# Patient Record
Sex: FEMALE | Race: WHITE | Employment: FULL TIME | URBAN - METROPOLITAN AREA
[De-identification: names, ages, dates, MRNs, and addresses within clinical notes are randomized per-mention and may not be internally consistent; named-entity substitution may affect disease eponyms.]

---

## 2017-07-18 ENCOUNTER — HOSPITAL ENCOUNTER (OUTPATIENT)
Dept: MAMMOGRAPHY | Age: 47
Discharge: HOME | End: 2017-07-18
Attending: SPECIALIST
Payer: COMMERCIAL

## 2017-07-18 DIAGNOSIS — Z12.31 VISIT FOR SCREENING MAMMOGRAM: ICD-10-CM

## 2017-07-18 PROCEDURE — 77067 SCR MAMMO BI INCL CAD: CPT

## 2018-01-18 ENCOUNTER — HOSPITAL ENCOUNTER (OUTPATIENT)
Dept: MRI IMAGING | Age: 48
Discharge: HOME | End: 2018-01-18
Attending: FAMILY MEDICINE
Payer: COMMERCIAL

## 2018-01-18 DIAGNOSIS — M54.2 CERVICALGIA: ICD-10-CM

## 2018-01-18 PROCEDURE — 72141 MRI NECK SPINE W/O DYE: CPT

## 2018-01-25 ENCOUNTER — HOSPITAL ENCOUNTER (EMERGENCY)
Age: 48
Discharge: HOME | End: 2018-01-25
Attending: EMERGENCY MEDICINE
Payer: COMMERCIAL

## 2018-01-25 VITALS
RESPIRATION RATE: 16 BRPM | TEMPERATURE: 98.4 F | HEIGHT: 65 IN | BODY MASS INDEX: 38.16 KG/M2 | WEIGHT: 229.06 LBS | OXYGEN SATURATION: 98 % | SYSTOLIC BLOOD PRESSURE: 170 MMHG | DIASTOLIC BLOOD PRESSURE: 86 MMHG | HEART RATE: 98 BPM

## 2018-01-25 DIAGNOSIS — M54.12 CERVICAL RADICULOPATHY: Primary | ICD-10-CM

## 2018-01-25 PROCEDURE — 99282 EMERGENCY DEPT VISIT SF MDM: CPT

## 2018-01-25 RX ORDER — BACLOFEN 10 MG/1
10 TABLET ORAL 3 TIMES DAILY
Qty: 20 TAB | Refills: 0 | Status: SHIPPED | OUTPATIENT
Start: 2018-01-25 | End: 2021-12-10

## 2018-01-25 RX ORDER — IBUPROFEN 800 MG/1
800 TABLET ORAL
Qty: 20 TAB | Refills: 0 | Status: SHIPPED | OUTPATIENT
Start: 2018-01-25 | End: 2018-02-01

## 2018-01-25 RX ORDER — OXYCODONE HYDROCHLORIDE 5 MG/1
5 TABLET ORAL
Qty: 20 TAB | Refills: 0 | Status: ON HOLD | OUTPATIENT
Start: 2018-01-25 | End: 2021-09-17

## 2018-01-25 NOTE — ED NOTES
PA has reviewed discharge instructions with the patient. The patient verbalized understanding. Patient discharged home ambulatory with mother.

## 2018-01-25 NOTE — LETTER
[address] 
[facility] EMERGENCY DEPT 
[address] [facility] [address] [id number] [phone] Work/School Note Date: 1/25/2018 To Whom It May concern: 
 
Loreta Sim was seen and treated today in the emergency room by the following provider(s): 
Attending Provider: Concur [place], DO Physician Assistant: SUBHA Reeder. Loreta Sim may return to work on [id number]. Sincerely, SUBHA Reeder

## 2018-01-25 NOTE — ED PROVIDER NOTES
EMERGENCY DEPARTMENT HISTORY AND PHYSICAL EXAM      Date: 1/25/2018  Patient Name: Aakash Duque    History of Presenting Illness     Chief Complaint   Patient presents with    Arm Pain     right arm pain from nerve problem in neck, has appointment for neurosurgeon        History Provided By: Patient    HPI: Aakash Duque, 50 y.o. female with PMHx significant for HTN, presents ambulatory to the ED with cc of constant, moderate right arm/neck pain which radiates down from the neck with assocaited weakness x 1 month. Pt reports that she had an MRI on 1/18/18. Review of the MRI shows narrowing of the spinal canal, DDD, and mild to moderate spinal canal stenosis as well as neuroforaminal narrowing worst at the C3-C4 and C5-C6; spinal canal stenosis is worst at C4-C5. She notes that she has a follow up with Dr. Yajaira Brunson (neurosurgery) in early February 2018, however, she states she has received no relief of her pain. She notes her PCP has treated her symptoms with Valium, Toradol, Decadron, and Oxycodone without relief. She denies any further symptoms and complaints at time of exam.     PCP: Shanon Closs, MD   Neurosurgery: Carlene Law MD      There are no other complaints, changes, or physical findings at this time. Current Outpatient Prescriptions   Medication Sig Dispense Refill    ibuprofen (MOTRIN) 800 mg tablet Take 1 Tab by mouth every eight (8) hours as needed for Pain for up to 7 days. 20 Tab 0    oxyCODONE IR (ROXICODONE) 5 mg immediate release tablet Take 1 Tab by mouth every four (4) hours as needed for Pain. Max Daily Amount: 30 mg. 20 Tab 0    baclofen (LIORESAL) 10 mg tablet Take 1 Tab by mouth three (3) times daily. 20 Tab 0    oxyCODONE IR (ROXICODONE) 5 mg immediate release tablet Take 1-2 Tabs by mouth every three (3) hours as needed. Max Daily Amount: 80 mg. 70 Tab 0    diazepam (VALIUM) 5 mg tablet Take 1 Tab by mouth every six (6) hours as needed (Muscle Spasms).  Max Daily Amount: 20 mg. 40 Tab 0    montelukast (SINGULAIR) 10 mg tablet Take 10 mg by mouth daily.  valsartan-hydrochlorothiazide (DIOVAN-HCT) 320-25 mg per tablet Take 1 Tab by mouth daily.  levocetirizine (XYZAL) 5 mg tablet Take 5 mg by mouth daily.  DULoxetine (CYMBALTA) 30 mg capsule Take 90 mg by mouth daily. Past History     Past Medical History:  Past Medical History:   Diagnosis Date    Chronic pain     back-low down right leg to foot    GERD (gastroesophageal reflux disease)     Hypertension     Psychiatric disorder     DEPRESSION       Past Surgical History:  Past Surgical History:   Procedure Laterality Date    ABDOMEN SURGERY PROC UNLISTED      [facility]    HX BREAST BIOPSY Right     benign core bx    HX CHOLECYSTECTOMY      HX GYN       x 2    HX GYN      hysterectomy    HX HERNIA REPAIR  1077    umbilical       Family History:  Family History   Problem Relation Age of Onset    Hypertension Mother     Arthritis-osteo Mother     Hypertension Father     Arthritis-osteo Father     Breast Cancer Sister 46       Social History:  Social History   Substance Use Topics    Smoking status: Current Every Day Smoker     Packs/day: 1.00     Years: 20.00    Smokeless tobacco: Never Used    Alcohol use Yes      Comment: social       Allergies: Allergies   Allergen Reactions    Augmentin [Amoxicillin-Pot Clavulanate] Nausea and Vomiting         Review of Systems   Review of Systems   Constitutional: Negative for fatigue and fever. HENT: Negative for ear pain and sore throat. Eyes: Negative for pain, redness and visual disturbance. Respiratory: Negative for cough and shortness of breath. Cardiovascular: Negative for chest pain and palpitations. Gastrointestinal: Negative for abdominal pain, nausea and vomiting. Genitourinary: Negative for dysuria, frequency and urgency. Musculoskeletal: Negative for back pain, gait problem, neck pain and neck stiffness.         Positive for right upper extremity/neck pain. Skin: Negative for rash and wound. Neurological: Negative for dizziness, weakness, light-headedness, numbness and headaches. Physical Exam   Physical Exam   Constitutional: She is oriented to person, place, and time. She appears well-developed and well-nourished. Non-toxic appearance. No distress. HENT:   Head: Normocephalic and atraumatic. Right Ear: External ear normal.   Left Ear: External ear normal.   Nose: Nose normal.   Mouth/Throat: Uvula is midline. No trismus in the jaw. Eyes: Conjunctivae and EOM are normal. Pupils are equal, round, and reactive to light. No scleral icterus. Neck: Normal range of motion and full passive range of motion without pain. C-Spine: No bruising, redness, or swelling. There is right sided paracervical muscle soreness throughout the trapezius to the posterior aspect of the right upper arm just proximal to the elbow with no atrophy. Cardiovascular: Normal rate and regular rhythm. Pulmonary/Chest: Effort normal. No accessory muscle usage. No tachypnea. No respiratory distress. She has no decreased breath sounds. She has no wheezes. Abdominal: Soft. There is no tenderness. Musculoskeletal: Normal range of motion. Full active ROM of right arm. Neurological: She is alert and oriented to person, place, and time. She is not disoriented. No cranial nerve deficit. GCS eye subscore is 4. GCS verbal subscore is 5. GCS motor subscore is 6. Skin: Skin is intact. No rash noted. Psychiatric: She has a normal mood and affect. Her speech is normal.   Nursing note and vitals reviewed. Medical Decision Making   I am the first provider for this patient. I reviewed the vital signs, available nursing notes, past medical history, past surgical history, family history and social history. Vital Signs-Reviewed the patient's vital signs.   Patient Vitals for the past 12 hrs:   Temp Pulse Resp BP SpO2   01/25/18 1235 98.4 °F (36.9 °C) 98 16 170/86 98 %       Records Reviewed: Nursing Notes, Old Medical Records and Previous Radiology Studies    Provider Notes (Medical Decision Making):   Recent MRI reviewed. Presentation consistent with cervical radiculopathy. Neurosurgery appointment pending.  reviewed. ED Course:   Initial assessment performed. The patients presenting problems have been discussed, and they are in agreement with the care plan formulated and outlined with them. I have encouraged them to ask questions as they arise throughout their visit. 1:07 PM  I have reviewed discharge instructions with the patient and explained medications that she is being discharged with. The patient verbalized understanding and agrees with plan. Disposition:  Discharge Note:  1:07 PM  The patient has been re-evaluated and is ready for discharge. Reviewed available results with patient. Counseled patient on diagnosis and care plan. Patient has expressed understanding, and all questions have been answered. Patient agrees with plan and agrees to follow up as recommended, or return to the ED if their symptoms worsen. Discharge instructions have been provided and explained to the patient, along with reasons to return to the ED. PLAN:  1. Current Discharge Medication List      START taking these medications    Details   ibuprofen (MOTRIN) 800 mg tablet Take 1 Tab by mouth every eight (8) hours as needed for Pain for up to 7 days. Qty: 20 Tab, Refills: 0      !! oxyCODONE IR (ROXICODONE) 5 mg immediate release tablet Take 1 Tab by mouth every four (4) hours as needed for Pain. Max Daily Amount: 30 mg.  Qty: 20 Tab, Refills: 0    Associated Diagnoses: Cervical radiculopathy      baclofen (LIORESAL) 10 mg tablet Take 1 Tab by mouth three (3) times daily. Qty: 20 Tab, Refills: 0       !! - Potential duplicate medications found. Please discuss with provider.       CONTINUE these medications which have NOT CHANGED    Details   !! oxyCODONE IR (ROXICODONE) 5 mg immediate release tablet Take 1-2 Tabs by mouth every three (3) hours as needed. Max Daily Amount: 80 mg.  Qty: 70 Tab, Refills: 0       !! - Potential duplicate medications found. Please discuss with provider. 2.   Follow-up Information     Follow up With Details Comments Contact Info    Megha Evans MD Schedule an appointment as soon as possible for a visit NEUROSURGERY: call to schedule follow up [address] [facility]  [phone]          Return to ED if worse     Diagnosis     Clinical Impression:   1. Cervical radiculopathy        Attestations: This note is prepared by Nydia Francisco, acting as Scribe for Clyde Saunders. MUNA Harrington: The scribe's documentation has been prepared under my direction and personally reviewed by me in its entirety. I confirm that the note above accurately reflects all work, treatment, procedures, and medical decision making performed by me.

## 2018-01-25 NOTE — DISCHARGE INSTRUCTIONS
Thank you! Thank you for allowing us to provide you with excellent care today. We hope we addressed all of your concerns and needs. We strive to provide excellent quality care in the Emergency Department. You will receive a survey after your visit to evaluate the care you were provided. Please rate us a level 5 (excellent), as anything less than excellent does not meet our goals. If you feel that you have not received excellent quality care or timely care, please ask to speak to the nurse manager. Please choose us in the future for your continued health care needs. ------------------------------------------------------------------------------------------------------------  The exam and treatment you received in the Emergency Department were for an urgent problem and are not intended as complete care. It is important that you follow up with a doctor, nurse practitioner, or physician assistant for ongoing care. If your symptoms become worse or you do not improve as expected and you are unable to reach your usual health care provider, you should return to the Emergency Department. We are available 24 hours a day. Please take your discharge instructions with you when you go to your follow-up appointment. If you have any problem arranging a follow-up appointment, contact the Emergency Department immediately. If a prescription has been provided, please have it filled as soon as possible to prevent a delay in treatment. Read the entire medication instruction sheet provided to you by the pharmacy. If you have any questions or reservations about taking the medication due to side effects or interactions with other medications, please call your primary care physician or contact the ER to speak with the charge nurse. Make an appointment with your family doctor or the physician you were referred to for follow-up of this visit as instructed on your discharge paperwork, as this is mandatory follow-up. Return to the ER if you are unable to be seen or if you are unable to be seen in a timely manner. If you have any problem arranging the follow-up visit, contact the Emergency Department immediately.

## 2018-07-25 ENCOUNTER — HOSPITAL ENCOUNTER (OUTPATIENT)
Dept: MAMMOGRAPHY | Age: 48
Discharge: HOME | End: 2018-07-25
Attending: SPECIALIST
Payer: COMMERCIAL

## 2018-07-25 DIAGNOSIS — Z12.39 SCREENING BREAST EXAMINATION: ICD-10-CM

## 2018-07-25 PROCEDURE — 77067 SCR MAMMO BI INCL CAD: CPT

## 2019-09-25 ENCOUNTER — HOSPITAL ENCOUNTER (OUTPATIENT)
Dept: MAMMOGRAPHY | Age: 49
Discharge: HOME | End: 2019-09-25
Attending: SPECIALIST
Payer: COMMERCIAL

## 2019-09-25 DIAGNOSIS — Z12.39 BREAST SCREENING: ICD-10-CM

## 2019-09-25 PROCEDURE — 77067 SCR MAMMO BI INCL CAD: CPT

## 2020-09-14 NOTE — PROGRESS NOTES
Have you been tested for COVID-19 in the past 7 days? Do you have a personal history of COVID-19 within the past 28 days? If Yes, What was the method of testing: clinical assumption or test result? Have you had close contact with a known to be positive COVID-19 patient within the past 14 days? 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 NO    Do you live in a SNF, adult home or other institutional setting? NO  If Yes, have they experienced a flood of COVID-19 positive patients? NO    In the past 2-14 days have you had any of the following symptoms    Cough   New onset Shortness of breath or difficulty breathing NO    Or at least two of these symptoms:   Fever greater than 100 F   Chills   Repeated shaking with chills   Muscle pain   Headache   Sore throat   New loss of taste or smell   New onset diarrhea  NO

## 2020-09-15 ENCOUNTER — HOSPITAL ENCOUNTER (OUTPATIENT)
Dept: INTERVENTIONAL RADIOLOGY/VASCULAR | Age: 50
Discharge: HOME | End: 2020-09-15
Attending: PHYSICIAN ASSISTANT
Payer: COMMERCIAL

## 2020-09-15 VITALS — HEIGHT: 65 IN | BODY MASS INDEX: 41.65 KG/M2 | WEIGHT: 250 LBS

## 2020-09-15 DIAGNOSIS — M54.50 LOW BACK PAIN WITH RADIATION: ICD-10-CM

## 2020-09-15 DIAGNOSIS — M54.2 NECK PAIN, BILATERAL: ICD-10-CM

## 2020-09-15 DIAGNOSIS — M43.06 SPONDYLOLYSIS, LUMBAR REGION: ICD-10-CM

## 2020-09-15 DIAGNOSIS — M48.062 LUMBAR STENOSIS WITH NEUROGENIC CLAUDICATION: ICD-10-CM

## 2020-09-15 DIAGNOSIS — M50.30 DDD (DEGENERATIVE DISC DISEASE), CERVICAL: ICD-10-CM

## 2020-09-15 DIAGNOSIS — M48.061 SPINAL STENOSIS OF LUMBAR REGION: ICD-10-CM

## 2020-09-15 DIAGNOSIS — M43.16 SPONDYLOLISTHESIS OF LUMBAR REGION: ICD-10-CM

## 2020-09-15 DIAGNOSIS — M48.061 LUMBAR FORAMINAL STENOSIS: ICD-10-CM

## 2020-09-15 PROCEDURE — 74011000636 HC RX REV CODE- 636: Performed by: STUDENT IN AN ORGANIZED HEALTH CARE EDUCATION/TRAINING PROGRAM

## 2020-09-15 PROCEDURE — 64483 NJX AA&/STRD TFRM EPI L/S 1: CPT

## 2020-09-15 PROCEDURE — 74011000250 HC RX REV CODE- 250: Performed by: STUDENT IN AN ORGANIZED HEALTH CARE EDUCATION/TRAINING PROGRAM

## 2020-09-15 PROCEDURE — 74011250636 HC RX REV CODE- 250/636: Performed by: STUDENT IN AN ORGANIZED HEALTH CARE EDUCATION/TRAINING PROGRAM

## 2020-09-15 PROCEDURE — 77030003666 HC NDL SPINAL BD -A

## 2020-09-15 PROCEDURE — 2709999900 HC NON-CHARGEABLE SUPPLY

## 2020-09-15 RX ORDER — LIDOCAINE HYDROCHLORIDE 10 MG/ML
4 INJECTION, SOLUTION EPIDURAL; INFILTRATION; INTRACAUDAL; PERINEURAL ONCE
Status: COMPLETED | OUTPATIENT
Start: 2020-09-15 | End: 2020-09-15

## 2020-09-15 RX ORDER — LIDOCAINE HYDROCHLORIDE 20 MG/ML
20 INJECTION, SOLUTION INFILTRATION; PERINEURAL ONCE
Status: DISCONTINUED | OUTPATIENT
Start: 2020-09-15 | End: 2020-09-15 | Stop reason: SDUPTHER

## 2020-09-15 RX ORDER — DEXAMETHASONE SODIUM PHOSPHATE 10 MG/ML
20 INJECTION INTRAMUSCULAR; INTRAVENOUS ONCE
Status: DISCONTINUED | OUTPATIENT
Start: 2020-09-15 | End: 2020-09-15 | Stop reason: SDUPTHER

## 2020-09-15 RX ORDER — SODIUM CHLORIDE 9 MG/ML
3 INJECTION INTRAMUSCULAR; INTRAVENOUS; SUBCUTANEOUS ONCE
Status: DISCONTINUED | OUTPATIENT
Start: 2020-09-15 | End: 2020-09-15 | Stop reason: SDUPTHER

## 2020-09-15 RX ORDER — LIDOCAINE HYDROCHLORIDE 20 MG/ML
20 INJECTION, SOLUTION INFILTRATION; PERINEURAL ONCE
Status: COMPLETED | OUTPATIENT
Start: 2020-09-15 | End: 2020-09-15

## 2020-09-15 RX ORDER — DEXAMETHASONE SODIUM PHOSPHATE 10 MG/ML
10 INJECTION INTRAMUSCULAR; INTRAVENOUS ONCE
Status: COMPLETED | OUTPATIENT
Start: 2020-09-15 | End: 2020-09-15

## 2020-09-15 RX ORDER — LIDOCAINE HYDROCHLORIDE 10 MG/ML
5 INJECTION, SOLUTION EPIDURAL; INFILTRATION; INTRACAUDAL; PERINEURAL ONCE
Status: DISCONTINUED | OUTPATIENT
Start: 2020-09-15 | End: 2020-09-15 | Stop reason: SDUPTHER

## 2020-09-15 RX ADMIN — LIDOCAINE HYDROCHLORIDE 40 MG: 20 INJECTION, SOLUTION INFILTRATION; PERINEURAL at 14:27

## 2020-09-15 RX ADMIN — IOPAMIDOL 3 ML: 408 INJECTION, SOLUTION INTRATHECAL at 14:27

## 2020-09-15 RX ADMIN — DEXAMETHASONE SODIUM PHOSPHATE 14 MG: 10 INJECTION, SOLUTION INTRAMUSCULAR; INTRAVENOUS at 14:28

## 2020-09-15 RX ADMIN — LIDOCAINE HYDROCHLORIDE 5 ML: 10 INJECTION, SOLUTION EPIDURAL; INFILTRATION; INTRACAUDAL; PERINEURAL at 14:28

## 2020-09-15 NOTE — PROCEDURES
Interventional Radiology  Procedure Note        9/15/2020 2:31 PM    Patient: Geovanna Valencia     Informed consent obtained    Diagnosis: back pain with bilateral radiculopathy    Procedure(s): Bilateral L3-4 transforaminal KELVIN    Specimens removed:  none    Complications: None    Primary Physician: Duane Peek, MD    Recomendations: N/A    Discharge Disposition: stable; discharge to home    Full dictated report to follow    Duane Peek, MD  Interventional Radiology  [facility]  2:31 PM, 9/15/2020

## 2020-09-15 NOTE — DISCHARGE INSTRUCTIONS
[facility]  Special Procedures/Radiology Department      Steroidal Injection      Go home and rest.     No vigorous physical activity today. Be aware that numbness and/or tingling can occur up to 24 hours after the injection. No driving today. Resume your previous diet. Resume your previous medications. Depending on your job, you may return to work in 25 to 48 hours. It may take up to one week after the injection to see a change or an improvement in your symptoms. Be sure to follow up with your physician. Tell your physician if the injection helped with your symptoms or if the injection did nothing for your symptoms. For minor discomfort, you can take Tylenol, as directed on the label.       If you have any questions or concerns, please call [phone] and ask for the nurse on-call    Other:

## 2020-09-15 NOTE — H&P
Radiology History and Physical    Patient: Donovan Cramer 44 y.o. female       Chief Complaint: Back pain with bilateral radiculopathy    History of Present Illness: 44year old woman with chronic back pain and bilateral radiculopathy, pain and paresthesia radiating down anterior parts of both legs, right worse than left.     History:    Past Medical History:   Diagnosis Date    Chronic pain     back-low down right leg to foot    GERD (gastroesophageal reflux disease)     Hypertension     Psychiatric disorder     DEPRESSION     Family History   Problem Relation Age of Onset    Hypertension Mother    [facility] Arthritis-osteo Mother     Hypertension Father     Arthritis-osteo Father     Breast Cancer Sister 46     Social History     Socioeconomic History    Marital status: SINGLE     Spouse name: Not on file    Number of children: Not on file    Years of education: Not on file    Highest education level: Not on file   Occupational History    Not on file   Social Needs    Financial resource strain: Not on file    Food insecurity     Worry: Not on file     Inability: Not on file    Transportation needs     Medical: Not on file     Non-medical: Not on file   Tobacco Use    Smoking status: Current Every Day Smoker     Packs/day: 1.00     Years: 20.00     Pack years: 20.00    Smokeless tobacco: Never Used   Substance and Sexual Activity    Alcohol use: Yes     Comment: social    Drug use: No    Sexual activity: Not on file   Lifestyle    Physical activity     Days per week: Not on file     Minutes per session: Not on file    Stress: Not on file   Relationships    Social connections     Talks on phone: Not on file     Gets together: Not on file     Attends Hinduism service: Not on file     Active member of club or organization: Not on file     Attends meetings of clubs or organizations: Not on file     Relationship status: Not on file    Intimate partner violence     Fear of current or ex partner: Not on file     Emotionally abused: Not on file     Physically abused: Not on file     Forced sexual activity: Not on file   Other Topics Concern    Not on file   Social History Narrative    Not on file       Allergies: Allergies   Allergen Reactions    Augmentin [Amoxicillin-Pot Clavulanate] Nausea and Vomiting       Current Medications:  Current Outpatient Medications   Medication Sig    oxyCODONE IR (ROXICODONE) 5 mg immediate release tablet Take 1 Tab by mouth every four (4) hours as needed for Pain. Max Daily Amount: 30 mg.    baclofen (LIORESAL) 10 mg tablet Take 1 Tab by mouth three (3) times daily.  oxyCODONE IR (ROXICODONE) 5 mg immediate release tablet Take 1-2 Tabs by mouth every three (3) hours as needed. Max Daily Amount: 80 mg.    diazepam (VALIUM) 5 mg tablet Take 1 Tab by mouth every six (6) hours as needed (Muscle Spasms). Max Daily Amount: 20 mg.    montelukast (SINGULAIR) 10 mg tablet Take 10 mg by mouth daily.  valsartan-hydrochlorothiazide (DIOVAN-HCT) 320-25 mg per tablet Take 1 Tab by mouth daily.  levocetirizine (XYZAL) 5 mg tablet Take 5 mg by mouth daily.  DULoxetine (CYMBALTA) 30 mg capsule Take 90 mg by mouth daily. Current Facility-Administered Medications   Medication Dose Route Frequency    iopamidoL (ISOVUE-M 200) 41 % contrast solution 3 mL  3 mL IntraSPINal ONCE    lidocaine (XYLOCAINE) 20 mg/mL (2 %) injection 400 mg  20 mL SubCUTAneous ONCE    lidocaine (PF) (XYLOCAINE) 10 mg/mL (1 %) injection 4 mL  4 mL SubCUTAneous ONCE    dexamethasone (PF) (DECADRON) 10 mg/mL injection 10 mg  10 mg IntraVENous ONCE        Physical Exam:  Height 5' 5\" (1.651 m), weight 113.4 kg (250 lb), not currently breastfeeding.   GENERAL: alert, cooperative, no distress, appears stated age,   LUNG: Nonlabored respiration on room air  HEART: regular rate and rhythm    Alerts:    Hospital Problems  Date Reviewed: 6/28/2016    None          Laboratory:    No results for input(s): HGB, HCT, WBC, PLT, INR, BUN, CREA, K, CRCLT, HGBEXT, HCTEXT, PLTEXT, INREXT in the last 72 hours. No lab exists for component: PTT, PT      Plan of Care/Planned Procedure:  Risks, benefits, and alternatives reviewed with patient and she agrees to proceed with the procedure.      Bilateral L3-4 transforaminal KELVIN    Jesenia Cruz MD  Interventional Radiology  [facility]  2:03 PM, 9/15/2020

## 2020-10-23 ENCOUNTER — TRANSCRIBE ORDER (OUTPATIENT)
Dept: SCHEDULING | Age: 50
End: 2020-10-23

## 2020-10-23 DIAGNOSIS — M50.30 DDD (DEGENERATIVE DISC DISEASE), CERVICAL: ICD-10-CM

## 2020-10-23 DIAGNOSIS — M54.50 LOW BACK PAIN WITH RADIATION: ICD-10-CM

## 2020-10-23 DIAGNOSIS — M48.062 LUMBAR STENOSIS WITH NEUROGENIC CLAUDICATION: ICD-10-CM

## 2020-10-23 DIAGNOSIS — M48.061 LUMBAR FORAMINAL STENOSIS: ICD-10-CM

## 2020-10-23 DIAGNOSIS — M54.2 NECK PAIN: Primary | ICD-10-CM

## 2020-11-11 ENCOUNTER — HOSPITAL ENCOUNTER (OUTPATIENT)
Dept: MRI IMAGING | Age: 50
Discharge: HOME | End: 2020-11-11
Attending: PHYSICIAN ASSISTANT
Payer: COMMERCIAL

## 2020-11-11 DIAGNOSIS — M50.30 DDD (DEGENERATIVE DISC DISEASE), CERVICAL: ICD-10-CM

## 2020-11-11 DIAGNOSIS — M48.061 LUMBAR FORAMINAL STENOSIS: ICD-10-CM

## 2020-11-11 DIAGNOSIS — M54.50 LOW BACK PAIN WITH RADIATION: ICD-10-CM

## 2020-11-11 DIAGNOSIS — M48.062 LUMBAR STENOSIS WITH NEUROGENIC CLAUDICATION: ICD-10-CM

## 2020-11-11 DIAGNOSIS — M54.2 NECK PAIN: ICD-10-CM

## 2020-11-11 PROCEDURE — 72141 MRI NECK SPINE W/O DYE: CPT

## 2021-02-17 ENCOUNTER — TRANSCRIBE ORDER (OUTPATIENT)
Dept: SCHEDULING | Age: 51
End: 2021-02-17

## 2021-02-17 DIAGNOSIS — Z12.31 SCREENING MAMMOGRAM FOR HIGH-RISK PATIENT: Primary | ICD-10-CM

## 2021-03-18 ENCOUNTER — HOSPITAL ENCOUNTER (OUTPATIENT)
Dept: MAMMOGRAPHY | Age: 51
Discharge: HOME | End: 2021-03-18
Attending: SPECIALIST
Payer: COMMERCIAL

## 2021-03-18 DIAGNOSIS — Z12.31 SCREENING MAMMOGRAM FOR HIGH-RISK PATIENT: ICD-10-CM

## 2021-03-18 PROCEDURE — 77067 SCR MAMMO BI INCL CAD: CPT

## 2021-09-17 ENCOUNTER — HOSPITAL ENCOUNTER (OUTPATIENT)
Age: 51
Setting detail: OUTPATIENT SURGERY
Discharge: HOME | End: 2021-09-17
Attending: INTERNAL MEDICINE | Admitting: INTERNAL MEDICINE
Payer: COMMERCIAL

## 2021-09-17 VITALS
SYSTOLIC BLOOD PRESSURE: 124 MMHG | OXYGEN SATURATION: 95 % | RESPIRATION RATE: 13 BRPM | HEIGHT: 65 IN | BODY MASS INDEX: 41.65 KG/M2 | HEART RATE: 64 BPM | TEMPERATURE: 98.1 F | DIASTOLIC BLOOD PRESSURE: 76 MMHG | WEIGHT: 250 LBS

## 2021-09-17 DIAGNOSIS — R94.39 ABNORMAL STRESS TEST: ICD-10-CM

## 2021-09-17 PROCEDURE — 93458 L HRT ARTERY/VENTRICLE ANGIO: CPT | Performed by: INTERNAL MEDICINE

## 2021-09-17 PROCEDURE — 77030004532 HC CATH ANGI DX IMP BSC -A: Performed by: INTERNAL MEDICINE

## 2021-09-17 PROCEDURE — 99152 MOD SED SAME PHYS/QHP 5/>YRS: CPT | Performed by: INTERNAL MEDICINE

## 2021-09-17 PROCEDURE — 2709999900 HC NON-CHARGEABLE SUPPLY: Performed by: INTERNAL MEDICINE

## 2021-09-17 PROCEDURE — 74011000636 HC RX REV CODE- 636: Performed by: INTERNAL MEDICINE

## 2021-09-17 PROCEDURE — 77030010221 HC SPLNT WR POS TELE -B: Performed by: INTERNAL MEDICINE

## 2021-09-17 PROCEDURE — 77030015766: Performed by: INTERNAL MEDICINE

## 2021-09-17 PROCEDURE — 99153 MOD SED SAME PHYS/QHP EA: CPT | Performed by: INTERNAL MEDICINE

## 2021-09-17 PROCEDURE — C1769 GUIDE WIRE: HCPCS | Performed by: INTERNAL MEDICINE

## 2021-09-17 PROCEDURE — 77030013744: Performed by: INTERNAL MEDICINE

## 2021-09-17 PROCEDURE — C1894 INTRO/SHEATH, NON-LASER: HCPCS | Performed by: INTERNAL MEDICINE

## 2021-09-17 PROCEDURE — 74011000250 HC RX REV CODE- 250: Performed by: INTERNAL MEDICINE

## 2021-09-17 PROCEDURE — 74011250636 HC RX REV CODE- 250/636: Performed by: INTERNAL MEDICINE

## 2021-09-17 PROCEDURE — 77030019569 HC BND COMPR RAD TERU -B: Performed by: INTERNAL MEDICINE

## 2021-09-17 PROCEDURE — 76937 US GUIDE VASCULAR ACCESS: CPT | Performed by: INTERNAL MEDICINE

## 2021-09-17 RX ORDER — GABAPENTIN 300 MG/1
300 CAPSULE ORAL 3 TIMES DAILY
COMMUNITY

## 2021-09-17 RX ORDER — VERAPAMIL HYDROCHLORIDE 2.5 MG/ML
INJECTION, SOLUTION INTRAVENOUS AS NEEDED
Status: DISCONTINUED | OUTPATIENT
Start: 2021-09-17 | End: 2021-09-17 | Stop reason: HOSPADM

## 2021-09-17 RX ORDER — HEPARIN SODIUM 1000 [USP'U]/ML
INJECTION, SOLUTION INTRAVENOUS; SUBCUTANEOUS AS NEEDED
Status: DISCONTINUED | OUTPATIENT
Start: 2021-09-17 | End: 2021-09-17 | Stop reason: HOSPADM

## 2021-09-17 RX ORDER — AMLODIPINE BESYLATE AND ATORVASTATIN CALCIUM 10; 10 MG/1; MG/1
1 TABLET, FILM COATED ORAL DAILY
COMMUNITY
End: 2021-12-10

## 2021-09-17 RX ORDER — SODIUM CHLORIDE 9 MG/ML
3 INJECTION, SOLUTION INTRAVENOUS CONTINUOUS
Status: DISCONTINUED | OUTPATIENT
Start: 2021-09-17 | End: 2021-09-17 | Stop reason: HOSPADM

## 2021-09-17 RX ORDER — METHYLPHENIDATE HYDROCHLORIDE 36 MG/1
36 TABLET ORAL AS NEEDED
COMMUNITY

## 2021-09-17 RX ORDER — FENTANYL CITRATE 50 UG/ML
INJECTION, SOLUTION INTRAMUSCULAR; INTRAVENOUS AS NEEDED
Status: DISCONTINUED | OUTPATIENT
Start: 2021-09-17 | End: 2021-09-17 | Stop reason: HOSPADM

## 2021-09-17 RX ORDER — METOPROLOL SUCCINATE 100 MG/1
100 TABLET, EXTENDED RELEASE ORAL DAILY
Qty: 90 TABLET | Refills: 3 | Status: SHIPPED | OUTPATIENT
Start: 2021-09-17

## 2021-09-17 RX ORDER — METOPROLOL SUCCINATE 50 MG/1
50 TABLET, EXTENDED RELEASE ORAL DAILY
Status: ON HOLD | COMMUNITY
End: 2021-09-17 | Stop reason: SDUPTHER

## 2021-09-17 RX ORDER — IRBESARTAN 300 MG/1
300 TABLET ORAL
COMMUNITY
End: 2021-12-10

## 2021-09-17 RX ORDER — MIDAZOLAM HYDROCHLORIDE 1 MG/ML
INJECTION, SOLUTION INTRAMUSCULAR; INTRAVENOUS AS NEEDED
Status: DISCONTINUED | OUTPATIENT
Start: 2021-09-17 | End: 2021-09-17 | Stop reason: HOSPADM

## 2021-09-17 RX ORDER — SODIUM CHLORIDE 9 MG/ML
1.5 INJECTION, SOLUTION INTRAVENOUS CONTINUOUS
Status: DISCONTINUED | OUTPATIENT
Start: 2021-09-17 | End: 2021-09-17 | Stop reason: HOSPADM

## 2021-09-17 RX ADMIN — SODIUM CHLORIDE 3 ML/KG/HR: 9 INJECTION, SOLUTION INTRAVENOUS at 14:21

## 2021-09-17 NOTE — H&P
[address]  HISTORY AND PHYSICAL    Name:  Martina Duque  MR#:  [id number]  :  1970  ACCOUNT #:  [de-identified]  ADMIT DATE:  2021      HISTORY OF PRESENT ILLNESS:  The patient is here for elective outpatient catheterization with possible PCI. Briefly, the patient presented to the office for a need of cardiac clearance to proceed with cervical disk surgery. On the basis of an abnormal EKG, she was sent for a head scan which was abnormal and demonstrated \"small, mild defect involving the mid anterior wall\" and stress images which is reversible suggesting a small amount of ischemia in the LAD distribution. Other walls appear to perfuse normally, ejection fraction 71%. Because of a significant heart murmur found on physical exam, she had an echocardiogram which demonstrated the following:  Asymmetric septal hypertrophy with left ventricular outflow tract obstruction. With Valsalva maneuver, the gradient is 117 mmHg, velocity 5.4 meters per second. On the basis of these findings, as part of her evaluation for her cervical disk surgery, she is having a cath today. PROBLEM LIST:  Hypertension, one-pack-a-day smoker, obstructive sleep apnea, family history for premature heart disease, and depression. REVIEW OF SYSTEMS:  Significant for fatigue and shortness of breath with ordinary housework. She denies diaphoresis, syncope, orthopnea, paroxysmal nocturnal dyspnea, any dizziness or syncope or palpitations. PHYSICAL EXAMINATION:  GENERAL:  This is a well-developed, pleasant, healthy, middle-aged woman. VITAL SIGNS:  As follows:  Blood pressure is 136/80, heart rate 82, respirations 16, sats 92%. HEENT:  Unremarkable. NECK:  Supple. No adenopathy. CHEST WALL:  Nontender. LUNGS:  Clear. HEART:  Regular, moderate rhythm. Harsh 3/6 systolic murmur at the left lower sternal border and apex. No history of gallop. No thrills, lifts, or heaves.   ABDOMEN:  Obese, nontender. No bruits. No ascites or palpable masses. NEUROLOGIC:  The patient is awake, alert, and appropriate. Normal speech. Normal gait. No focal motor findings. IMPRESSION:  The patient had an abnormal EKG which led to evaluation, which indicates anterior wall ischemia on her nuclear stress test.  She had a PET scan. In addition, she was discovered to have hypertrophic obstructive heart disease with a significant gradient with Valsalva over 110 mmHg. The procedure and possible complications were reviewed with the patient in detail. She agrees to proceed.         Augie Piña MD      [id number]  D:  09/17/2021 14:20  T:  09/17/2021 17:12  JOB #:  [id number]

## 2021-09-17 NOTE — PROGRESS NOTES
Cardiac Cath Lab Recovery Arrival Note:      Lisa Arambula arrived to Cardiac Cath Lab, Recovery Area. Staff introduced to patient. Patient identifiers verified with NAME and DATE OF BIRTH. Procedure verified with patient. Consent forms reviewed and signed by patient or authorized representative and verified. Allergies verified. Patient and family oriented to department. Patient and family informed of procedure and plan of care. Questions answered with review. Patient prepped for procedure, per orders from physician, prior to arrival.    Patient on cardiac monitor, non-invasive blood pressure, SPO2 monitor. On RA. Patient is A&Ox 4. Patient reports no complaints. Patient in stretcher, in low position, with side rails up, call bell within reach, patient instructed to call if assistance as needed. Patient prep in: [address], [place] [address].    Patient family has phone: [phone]  Family in: hospital.   Prep by: PILLO

## 2021-09-17 NOTE — DISCHARGE INSTRUCTIONS
Patient Discharge Instructions    Angelica Hooks / [id number] : 1970    Admitted 2021 Discharged: 2021     Take Home Medications          · It is important that you take the medication exactly as they are prescribed. · Keep your medication in the bottles provided by the pharmacist and keep a list of the medication names, dosages, and times to be taken in your wallet. · Do not take other medications without consulting your doctor. BRING ALL OF YOUR MEDICINES TO YOUR OFFICE VISIT with Dr. Leena Boyle. Follow-up with Micheline Hernandez MD in 2 weeks. Call the office to arrange your appointment. Cardiac Catheterization  Discharge Instructions     Do not drive, operate any machinery, or sign any legal documents for 24 hours after your procedure. You must have someone to drive you home.  You may take a shower 24 hours after your cardiac catheterization. Be sure to get the dressing wet and then remove it; gently wash the area with warm soapy water. Pat dry and leave open to air. To help prevent infections, be sure to keep the cath site clean and dry. No lotions, creams, powders, ointments, etc. in the cath site for approximately 1 week.  Do not take a tub bath, get in a hot tub or swimming pool for approximately 5 days or until the cath site is completely healed.  No strenuous activity or heavy lifting over 10 lbs. for 7 days.  Drink plenty of fluids for 24-48 hours after your cath to flush the contrast dye from your kidneys. No alcoholic beverages for 24 hours. You may resume your previous diet (low fat, low cholesterol) after your cath.  After your cath, some bruising or discomfort is common during the healing process. Tylenol, 1-2 tablets every 6 hours as needed, is recommended if you experience any discomfort.   If you experience any signs or symptoms of infection such as fever, chills, or poorly healing incision, persistent tenderness or swelling in the groin, redness and/or warmth to the touch, numbness, significant tingling or pain at the groin site or affected extremity, rash, drainage from the cath site, or if the leg feels tight or swollen, call your physician right away.  If bleeding at the cath site occurs, take a clean gauze pad and apply direct pressure to the groin just above the puncture site. Call 911 immediately, and continue to apply direct pressure until an ambulance gets to your location.  You may return to work  2  days after your cardiac cath if no groin bleeding. Information obtained by :  I understand that if any problems occur once I am at home I am to contact my physician. I understand and acknowledge receipt of the instructions indicated above.                                                                                                                                            R.N.'s Signature                                                                  Date/Time                                                                                                                                              Patient or Representative Signature                                                          Date/Time      Cesar Winkler MD

## 2021-09-18 NOTE — PROGRESS NOTES
TRANSFER - IN REPORT:    Verbal report received from [place] on Edinson Geronimo  being received from procedural area for routine post - op. Report consisted of patients Situation, Background, Assessment and Recommendations(SBAR). Information from the following report(s) Procedure Summary, MAR and Recent Results was reviewed with the receiving clinician. Opportunity for questions and clarification was provided. Assessment completed upon patients arrival to [address] [place] [address]. Cardiac Cath Lab Recovery Arrival Note:    Edinson Geronimo arrived to [facility] recovery area. Patient procedure= LHC. Patient on cardiac monitor, non-invasive blood pressure, SPO2 monitor. On RA. IV  of ns on pump at 170 ml/hr. Patient status doing well without problems. Patient is A&Ox 4. Patient reports no complaints. PROCEDURE SITE CHECK:    Procedure site:without any bleeding and hematoma, no pain/discomfort reported at procedure site. No change in patient status. Continue to monitor patient and status.

## 2021-12-02 ENCOUNTER — TRANSCRIBE ORDER (OUTPATIENT)
Dept: REGISTRATION | Age: 51
End: 2021-12-02

## 2021-12-02 DIAGNOSIS — Z01.812 PRE-PROCEDURE LAB EXAM: Primary | ICD-10-CM

## 2021-12-07 NOTE — H&P
Jack Barker  Location: [place] [address] [address]  Patient #: [id number]  : 1970  Single / Language: [place] / Race: White  Female      History of Present Illness  The patient is a 44year old female who presents with neck pain. Additional reasons for visit:    Low Back Pain is described as the following:  Note for \"Low Back Pain\": She presents today mainly for lower back pain. She has both neck and lower back pain but her lower back is been the most significant of the last year. She has been failing conservative treatment with worsening pain with activities. The activities significantly worsen the back pain and the bilateral leg symptoms. She denies any bowel bladder difficulties. She does get some relief with sitting. She has a known history of a prior L4-5 fusion and junctional stenosis at L3-4.       Problem List/Past Medical   STENOSIS (724.02)    REVIEW OF SYSTEMS: Systems were reviewed by the provider.    DIETARY COUNSELING (V65.3)    Sciatica, right side (M54.31)    SPONDYLOLISTHESIS, ACQ. 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    Weight above 97th percentile (V49.89  Z78.9)    Cervical spinal stenosis (723.0  M48.02)    Cervical disc herniation (722.0  M50.20)    Lumbar stenosis with neurogenic claudication (724.03  M48.062)    Lumbar radiculopathy (724.4  M54.16)    Lumbar foraminal stenosis (724.02  M48.061)    DDD (degenerative disc disease), cervical (722.4  M50.30)    S/P lumbar laminectomy (V45.89  Z98.890)    Aftercare following surgery for injury or trauma (V58.43  Z48.89)    Neck pain, bilateral (723.1  M54.2)    Low back pain with radiation (724.2  M54.50)      Allergies   Augmentin *PENICILLINS*   [2021 09:40 AM]:    Family History   Hypertension   Father, Mother. Social History   Alcohol use   2015: Drinks beer and wine 3 times weekly, having 3-5 drinks per occasion. Rarely drinks more than 5 drinks per occasion. Tobacco use   Current some day smoker. Sun Exposure   occasionally  Caffeine use   2015: Drinks coffee, 3-4 drinks per day. Current work status   working full time  Tobacco / smoke exposure   yes outdoors only  Illicit drug use   none  Seat Belt Use   always  Exercise   2015: Walks 3-4 times per week. Marital status       Medication History   Gabapentin  (300MG Capsule, 2 (two) Oral at bedtime and 1 in the morning, Taken starting 2021) Active. Cymbalta  (30MG Capsule DR Part, 90MG DAILY Oral DAILY) Active. amLODIPine Besylate  (10MG Tablet, Oral) Active. Irbesartan-hydroCHLOROthiazide  (300-12.5MG Tablet, Oral) Active. Medications Reconciled     Pregnancy / Birth History   Pregnant   no    Past Surgical History   Breast Biopsy   right   Delivery   2 times  Dilation and Curettage of Uterus - Multiple    Gallbladder Surgery   laparoscopic  Hysterectomy   non-cancerous: partial and vaginal    Diagnostic Studies History   Cervical Spine X-ray   Date: 2021, Results: Review of the cervical x-rays demonstrate multilevel cervical spondylosis. No subluxations fractures or lytic lesions. Anterior osteophyte formation is noted. Lumbar Spine X-ray   Date: 2021, Results: Review of the lumbar x-rays today demonstrate a stable L4-5 fusion. She has a retrolisthesis at L3-4 approximately 4 mm. It reduces completely with flexion. She has no fracture lytic lesions.     Other Problems   Depression    High blood pressure    Allergic Urticaria    Treatment options were discussed with the patient in full.          Physical Exam Neurologic  Sensory  Light Touch - Intact - Globally. Overall Assessment of Muscle Strength and Tone reveals  Upper Extremities - Right Deltoid - 5/5. Left Deltoid - 5/5. Right Bicep - 5/5. Left Bicep - 5/5. Right Tricep - 5/5. Left Tricep - 5/5. Right Wrist Extensors - 5/5. Left Wrist Extensors - 5/5. Right Wrist Flexors - 5/5. Left Wrist Flexors - 5/5. Right Intrinsics - 5/5. Left Intrinsics - 5/5. Lower Extremities - Right Iliopsoas - 5/5. Left Iliopsoas - 5/5. Right Tibialis Anterior - 4-/5. Left Tibialis Anterior - 4-/5. Right Gastroc-Soleus - 5/5. Left Gastroc-Soleus - 5/5. Right EHL - 4-/5. Left EHL - 4/5. General Assessment of Reflexes  Right Hand - Rogel's sign is negative in the right hand. Left Hand - Rogel's sign is negative in the left hand. Right Ankle - Clonus is not present. Left Ankle - Clonus is not present. Reflexes (Dermatomes)  2/2 Normal - Left Achilles (L5-S2), Left Bicep (C5-6), Left Knee (L2-4), Left Tricep (C7-8), Left Brachioradialis (C5-6), Right Achilles (L5-S2), Right Bicep (C5-6), Right Knee (L2-4), Right Tricep (C7-8) and Right Brachioradialis (C5-6). Musculoskeletal  Global Assessment  Examination of related systems reveals - well-developed, well-nourished, in no acute distress, alert and oriented x 3 and normal coordination. Gait and Station - normal gait and station and normal posture. Right Lower Extremity - normal range of motion without pain and no instability, subluxation or laxity. Left Lower Extremity - normal range of motion without pain and no instability, subluxation or laxity. Spine/Ribs/Pelvis  Cervical Spine - Evaluation of related systems reveals - no lymphadenopathy and neurovascularly intact bilaterally. Examination of the cervical spine reveals - no swelling, edema or erythema and normal sensation. Inspection and Palpation - Tenderness - moderate and localized. Assessment of pain reveals the following findings: - Location - cervical area.  ROJM - Flexion - 85 °. Right Lateral Flexion - 35 °. Left Lateral Flexion - 35 °. Extension - 70 °. Right Rotation - 80 °. Left Rotation - 80 °. Cervical Spine - Functional Testing - Foraminal Compression/Spurling's Test negative, Shoulder Depression Test negative, Upper Limb Tension Test negative. Thoracic (Dorsal) Spine - Examination of the thoracic spine reveals - no tenderness over thoracic vertebrae, no pain, normal sensation and normal thoracic spine movements. Lumbosacral Spine - Examination of the lumbosacral spine reveals - no tenderness to palpation, no pain, normal strength and tone, no laxity or crepitus, normal lumbosacral spine movements and no known fractures or deformities. Functional Testing - Babinski Test negative, Prone Knee Bending Test negative, Slump Test negative, Straight Leg Raising Test negative. Assessment & Plan     REVIEW OF SYSTEMS: Systems were reviewed by the provider.(V49.9)    Current Plans  Pt Education - How to [address] using Patient Portal and 3rd Party Apps: discussed with patient and provided information. X-RAY EXAM OF CERVICAL SPINE MIN 4 VIEWS (72184) (AP, Lateral and Flexion and Extension views were taken today using Digital Radiography.)  X-RAY EXAM OF LUMBAR SPINE, 4 OR MORE VIEWS (51461) (AP, Lateral, Flexion and Extension views were taken today using Digital Radiography.)  Continued Gabapentin 300 MG Oral Capsule, 2 (two) Capsule at bedtime and 1 in the morning, #90, 11/01/2021, Ref. x1.    Lumbar stenosis with neurogenic claudication (724.03  M48.062)      S/P lumbar fusion (V45.4  Z98.1)  Impression: She overall is struggling with the increased pain above her previous fusion. She has failed conservative treatment including injection therapy physical therapy over the last year. We do lengthy discussion about treatment options. I think she is a candidate for a revision laminectomy at L3-4 and L4-5 with a posterior lumbar fusion from L3-L5. Risk and benefits were discussed with her. The risks and benefits were discussed at length with the patient and the patient has elected to proceed. Indications for surgery include failed conservative treatment. Alternative treatments, risks and the perioperative course were discussed with the patient. All questions were answered. The risks and benefits of the procedure were explained. Benefits include definitive diagnosis, relief of pain, elimination of deformity and improved function. Risks of surgery including bleeding, infection, weakness, numbness, CSF leak, failure to improve symptoms, exacerbation of medical co-morbidities and even death were discussed with the patient. Spondylolisthesis (Acquired) (738.4  M43.10)      Treatment options were discussed with the patient in full.(V65.49)    Current Plans  Presurgical planning was preformed with the patient today  Surgery to be scheduled  Procedure: Revision laminectomy L3-4 L4-5 with revision fusion L3-L5    Date of Surgery Update:  Nedra Gramajo was seen and examined. History and physical has been reviewed. The patient has been examined.  There have been no significant clinical changes since the completion of the originally dated History and Physical.    Signed By: Darrion Loco MD     December 15, 2021 7:18 AM               Signed by Darrion Loco MD

## 2021-12-10 ENCOUNTER — HOSPITAL ENCOUNTER (OUTPATIENT)
Dept: PREADMISSION TESTING | Age: 51
Discharge: HOME | End: 2021-12-10
Payer: COMMERCIAL

## 2021-12-10 ENCOUNTER — HOSPITAL ENCOUNTER (OUTPATIENT)
Dept: PREADMISSION TESTING | Age: 51
Discharge: HOME | End: 2021-12-10
Attending: ORTHOPAEDIC SURGERY
Payer: COMMERCIAL

## 2021-12-10 VITALS
HEIGHT: 65 IN | RESPIRATION RATE: 18 BRPM | WEIGHT: 264.11 LBS | OXYGEN SATURATION: 98 % | HEART RATE: 67 BPM | TEMPERATURE: 97.9 F | BODY MASS INDEX: 44 KG/M2

## 2021-12-10 DIAGNOSIS — Z01.812 PRE-PROCEDURE LAB EXAM: ICD-10-CM

## 2021-12-10 LAB
ANION GAP SERPL CALC-SCNC: 4 MMOL/L (ref 5–15)
APPEARANCE UR: CLEAR
BACTERIA URNS QL MICRO: NEGATIVE /HPF
BILIRUB UR QL: NEGATIVE
BUN SERPL-MCNC: 12 MG/DL (ref 6–20)
BUN/CREAT SERPL: 16 (ref 12–20)
CALCIUM SERPL-MCNC: 9.7 MG/DL (ref 8.5–10.1)
CHLORIDE SERPL-SCNC: 105 MMOL/L (ref 97–108)
CO2 SERPL-SCNC: 27 MMOL/L (ref 21–32)
COLOR UR: NORMAL
CREAT SERPL-MCNC: 0.75 MG/DL (ref 0.55–1.02)
EPITH CASTS URNS QL MICRO: NORMAL /LPF
ERYTHROCYTE [DISTWIDTH] IN BLOOD BY AUTOMATED COUNT: 13.9 % (ref 11.5–14.5)
EST. AVERAGE GLUCOSE BLD GHB EST-MCNC: 117 MG/DL
GLUCOSE SERPL-MCNC: 90 MG/DL (ref 65–100)
GLUCOSE UR STRIP.AUTO-MCNC: NEGATIVE MG/DL
HBA1C MFR BLD: 5.7 % (ref 4–5.6)
HCT VFR BLD AUTO: 43.8 % (ref 35–47)
HGB BLD-MCNC: 14.2 G/DL (ref 11.5–16)
HGB UR QL STRIP: NEGATIVE
HYALINE CASTS URNS QL MICRO: NORMAL /LPF (ref 0–5)
INR PPP: 1 (ref 0.9–1.1)
KETONES UR QL STRIP.AUTO: NEGATIVE MG/DL
LEUKOCYTE ESTERASE UR QL STRIP.AUTO: NEGATIVE
MCH RBC QN AUTO: 30.7 PG (ref 26–34)
MCHC RBC AUTO-ENTMCNC: 32.4 G/DL (ref 30–36.5)
MCV RBC AUTO: 94.6 FL (ref 80–99)
NITRITE UR QL STRIP.AUTO: NEGATIVE
NRBC # BLD: 0 K/UL (ref 0–0.01)
NRBC BLD-RTO: 0 PER 100 WBC
PH UR STRIP: 5.5 [PH] (ref 5–8)
PLATELET # BLD AUTO: 333 K/UL (ref 150–400)
PMV BLD AUTO: 11 FL (ref 8.9–12.9)
POTASSIUM SERPL-SCNC: 4.2 MMOL/L (ref 3.5–5.1)
PROT UR STRIP-MCNC: NEGATIVE MG/DL
PROTHROMBIN TIME: 10.2 SEC (ref 9–11.1)
RBC # BLD AUTO: 4.63 M/UL (ref 3.8–5.2)
RBC #/AREA URNS HPF: NORMAL /HPF (ref 0–5)
SODIUM SERPL-SCNC: 136 MMOL/L (ref 136–145)
SP GR UR REFRACTOMETRY: 1.01 (ref 1–1.03)
UA: UC IF INDICATED,UAUC: NORMAL
UROBILINOGEN UR QL STRIP.AUTO: 0.2 EU/DL (ref 0.2–1)
WBC # BLD AUTO: 9.5 K/UL (ref 3.6–11)
WBC URNS QL MICRO: NORMAL /HPF (ref 0–4)

## 2021-12-10 PROCEDURE — 85027 COMPLETE CBC AUTOMATED: CPT

## 2021-12-10 PROCEDURE — 93005 ELECTROCARDIOGRAM TRACING: CPT

## 2021-12-10 PROCEDURE — 83036 HEMOGLOBIN GLYCOSYLATED A1C: CPT

## 2021-12-10 PROCEDURE — 36415 COLL VENOUS BLD VENIPUNCTURE: CPT

## 2021-12-10 PROCEDURE — 81001 URINALYSIS AUTO W/SCOPE: CPT

## 2021-12-10 PROCEDURE — 80048 BASIC METABOLIC PNL TOTAL CA: CPT

## 2021-12-10 PROCEDURE — U0005 INFEC AGEN DETEC AMPLI PROBE: HCPCS

## 2021-12-10 PROCEDURE — 85610 PROTHROMBIN TIME: CPT

## 2021-12-10 PROCEDURE — 86900 BLOOD TYPING SEROLOGIC ABO: CPT

## 2021-12-10 RX ORDER — IRBESARTAN AND HYDROCHLOROTHIAZIDE 300; 12.5 MG/1; MG/1
1 TABLET, FILM COATED ORAL DAILY
COMMUNITY

## 2021-12-10 RX ORDER — OMEPRAZOLE 20 MG/1
20 TABLET, DELAYED RELEASE ORAL DAILY
COMMUNITY

## 2021-12-10 NOTE — PERIOP NOTES
Copy of covid card placed on chart    Called DR. AUGUSTINE-CARDIO OFFICE FOR LOV, AND ANY TESTING RECENTLY DONE, PT HAD CATH AND ECHO, DONE,     EKG PERFORMED

## 2021-12-10 NOTE — PERIOP NOTES
[facility]    Surgery Date:   12/15/21    [place] [facility] [place] staff will call you between 4 PM- 8 PM the day before surgery with your arrival time. If your surgery is on a Monday, we will call you the preceding Friday. Please call [phone] after 8 PM if you did not receive your arrival time. 1. Please report to [address] Patient Access/Admitting on the 1st floor. Bring your insurance card, photo identification, and any copayment ( if applicable). 2. If you are going home the same day of your surgery, you must have a responsible adult to drive you home. You need to have a responsible adult to stay with you the first 24 hours after surgery and you should not drive a car for 24 hours following your surgery. 3. Do NOT eat any solid foods after midnight the night before surgery including candy, mints or gum. You may drink clear liquids from midnight until 1 hour prior to arrival time. You may drink up to 12 ounces at one time every 4 hours. 4. Do NOT drink alcohol or smoke 24 hours before surgery. STOP smoking for 14 days prior as it helps with breathing and healing after surgery. 5. If your arrival time is 3pm or later, you may eat a light breakfast before 8am (toast, bagel-no butter, black coffee, plain tea, fruit juice-no pulp) Please note special instructions, if applicable, below for medications. 6. If you are being admitted to the hospital,please leave personal belongings/luggage in your car until you have an assigned hospital room number. 7. Please wear comfortable clothes. Wear your glasses instead of contacts. We ask that all money, jewelry and valuables be left at home. Wear no make up, particularly mascara, the day of surgery. 8.  All body piercings, rings, and jewelry need to be removed and left at home. Please wear your hair loose or down. Please no pony-tails, buns, or any metal hair accessories.  If you shower the morning of surgery, please do not apply any lotions or powders afterwards. You may wear deodorant. Do not shave any body area within 24 hours of your surgery. 9. Please follow all instructions to avoid any potential surgical cancellation. 10. Should your physical condition change, (i.e. fever, cold, flu, etc.) please notify your surgeon as soon as possible. 11. It is important to be on time. If a situation occurs where you may be delayed, please call:  [phone] / [phone] on the day of surgery. 12. The Preadmission Testing staff can be reached at [phone]      Current Outpatient Medications   Medication Sig    AMLODIPINE BESYLATE, BULK, Take 10 mg by mouth daily.  irbesartan-hydroCHLOROthiazide (AVALIDE) 300-12.5 mg per tablet Take 1 Tablet by mouth daily.  omeprazole (PriLOSEC OTC) 20 mg tablet Take 20 mg by mouth daily.  methylphenidate ER 36 mg 24 hr tab Take 36 mg by mouth as needed.  gabapentin (NEURONTIN) 300 mg capsule Take 300 mg by mouth three (3) times daily.  metoprolol succinate (TOPROL-XL) 100 mg tablet Take 1 Tablet by mouth daily.  DULoxetine (CYMBALTA) 30 mg capsule Take 90 mg by mouth daily. No current facility-administered medications for this encounter. 1. YOU MUST ONLY TAKE THESE MEDICATIONS THE MORNING OF SURGERY WITH A SIP OF WATER: GABAPENTIN, METOPROLOL, AMLODIPINE, PRILOSEC, DULOXETINE,   2. MEDICATIONS TO TAKE THE MORNING OF SURGERY ONLY IF NEEDED: N/A  3. HOLD these medications BEFORE Surgery: N/A  4. Ask your surgeon/prescribing physician about when/if to STOP taking these medications: N/A  5. Stop Aspirin and/or any non-steroidal anti-inflammatory drugs (i.e. Ibuprofen, Naproxen, Advil, Aleve) as directed by your surgeon. You may take Tylenol. Stop herbal supplements 1 week prior to  surgery. 6. If you are currently taking Plavix, Coumadin, or any other blood-thinning/anticoagulant medication contact your prescribing physician for instructions.     Preventing Infections Before and After - Your Surgery    IMPORTANT INSTRUCTIONS    Please read and follow these instructions carefully. If you are unable to comply with the below instructions your procedure will be cancelled. You play an important role in your health and preparation for surgery. To reduce the germs on your skin you will need to shower with CHG soap (Chorhexidine gluconate 4%) two times before surgery. CHG soap (Hibiclens, Hex-A-Clens or store brand)   CHG soap will be provided at your Preadmission Testing (PAT) appointment.  If you do not have a PAT appointment before surgery, you may arrange to  CHG soap from our office or purchase CHG soap at a pharmacy, grocery or department store.  You need to purchase TWO 4 ounce bottles to use for your 2 showers. Steps to follow:  1. Wash your hair with your normal shampoo and your body with regular soap and rinse well to remove shampoo and soap from your skin. 2. Wet a clean washcloth and turn off the shower. 3. Put CHG soap on washcloth and apply to your entire body from the neck down. Do not use on your head, face or private parts(genitals). Do not use CHG soap on open sores, wounds or areas of skin irritation. 4. Wash you body gently for 5 minutes. Do not wash your skin too hard. This soap does not create lather. Pay special attention to your underarms and from your belly button to your feet. 5. Turn the shower back on and rinse well to get CHG soap off your body. 6. Pat your skin dry with a clean, dry towel. Do not apply lotions or moisturizer. 7. Put on clean clothes and sleep on fresh bed sheets and do not allow pets to sleep with you. Shower with CHG soap 2 times before your surgery   The evening before your surgery   The morning of your surgery      Tips to help prevent infections after your surgery:  1. Protect your surgical wound from germs:  ? Hand washing is the most important thing you and your caregivers can do to prevent infections. ?  Keep your bandage clean and dry! ? Do not touch your surgical wound. 2. Use clean, freshly washed towels and washcloths every time you shower; do not share bath linens with others. 3. Until your surgical wound is healed, wear clothing and sleep on bed linens each day that are clean and freshly washed. 4. Do not allow pets to sleep in your bed with you or touch your surgical wound. 5. Do not smoke - smoking delays wound healing. This may be a good time to stop smoking. 6. If you have diabetes, it is important for you to manage your blood sugar levels properly before your surgery as well as after your surgery. Poorly managed blood sugar levels slow down wound healing and prevent you from healing completely. Prevention of Infection  Testing for Staphylococcus aureus on your skin before surgery    Staphylococcus aureus (staph) is a common bacteria that is found on the body. It normally does not cause infection on healthy skin. Before surgery, you will be tested to see if you have staph by swabbing the inside of your nose. When you have an incision with surgery, the goal is to protect that incision from infection. Removal of the staph bacteria before surgery can decrease the risk of a surgical site infection. If your nose swab is positive for staph you will be called. Your treatment will include 2 steps:   Prescription for Mupirocin ointment to be used in each nostril twice a day for 5 days.  Showering with Chlorhexidine (CHG) liquid soap for 5 days prior to surgery. How to use Mupirocin ointment in your nose  1.  the prescription from your pharmacy. You will receive a large tube of ointment which will be big enough for all of your treatments. You will apply this ointment to each nostril 2 times a day for 5 days. 2. Wash your hands with  gel or soap and water for 20 seconds before using ointment. 3. Place a pea-sized amount of ointment on a cotton Q-tip.   4. Apply ointment just inside of each nostril with the Q-tip. Do not push Q-tip or ointment deep inside you nose. 5. Press your nostrils together and massage for a few seconds. 6. Wash your hands with  gel or soap and water after you are finished. 7. Do not get ointment near your eyes. If it gets into your eyes, rinse them with cool water. 8. If you need to use nasal spray, clean the tip of the bottle with alcohol before use and do not use both at the same time. 9. If you are scheduled for COVID testing during the 5 days, do NOT apply morning dose until after the COVID test has been performed. How to use Chlorhexidine (CHG) 4% liquid soap  1. Purchase an 8 ounce bottle of CHG liquid soap (Chlorhexidine 4%, Hibiclens, Hex-A-Clens or store brand) at a pharmacy or grocery store. 2. Wash your hair with your normal shampoo and your body with regular soap and rinse well to remove shampoo and soap from your skin. 3. Wet a clean washcloth and turn off the shower. 4. Put CHG soap on washcloth and apply to your entire body from the neck down. Do not use on your head, face or private parts(genitals). Do not use CHG soap on open sores, wounds or areas of skin irritation. 5. Wash your body gently for 5 minutes. Do not wash your skin too hard. This soap does not create lather. Pay special attention to your underarms and from your belly button to your feet. 6. Turn the shower back on and rinse well to get CHG soap off your body. 7. Pat your skin dry with a clean, dry towel. Do not apply lotions or moisturizer. 8. Put on clean clothes and sleep on fresh bed sheets the night before surgery. Do not allow pets to sleep with you. Eating and Drinking Before Surgery     You may eat a regular dinner at the usual time on the day before your surgery.  Do NOT eat any solid foods after midnight unless your arrival time at the hospital is 3pm or later.    You may drink clear liquids only from 12 midnight until 1 hours prior to your arrival time at the hospital on the day of your surgery. Do NOT drink alcohol.  Clear liquids include:  o Water  o Fruit juices without pulp( i.e. apple juice)  o Carbonated beverages  o Black coffee (no cream/milk)  o Tea (no cream/milk)  o Gatorade   You may drink up to 12-16 ounces at one time every 4 hours between the hours of midnight and 1 hour before your arrival time at the hospital. Example- if your arrival time at the hospital is 6am, you may drink 12-16 ounces of clear liquids no later than 5am.   If your arrival time at the hospital is 3pm or later, you may eat a light breakfast before 8am.   A light breakfast includes:  o Toast or bagel (no butter)  o Black coffee (no cream/milk)  o Tea (no cream/milk)  o Fruit juices without pulp ( i.e. apple juice)  o Do NOT eat meat, eggs, vegetables or fruit   If you have any questions, please contact your surgeon's office. [facility]   Instructions for Pre-Surgery COVID-19 Testing     Across our ministry we have established standard guidelines to ensure the health and safety of our patients, residents and associates as we resume elective services for patients. All patients presenting for surgery are required to have a COVID-19 test result within 96 hours of their scheduled surgery. [facility] is providing this test free of charge to the patient.    Instructions for COVID-19 Testing:     Patients will receive a call from Pre-Admission Testing 4-5 days prior to surgery to schedule a date and time to come to the [address] [place] [address] Drive for their COVID-19 test   Patients are advised to self-quarantine after testing until their scheduled surgery   Once on site, patients will be registered and receive COVID test in their vehicle   If a patient is scheduled for normal Pre Admission Testing 96 hours from date of surgery, the patient will still have their COVID test done at the [address] [place] [address] located at [facility]  Positive results will be shared with the surgeon and anesthesiologist and may result in cancellation of the elective procedure    Testing Hours and Location:   Address:  [address] [facility] Dis[facility] (Map Attached)  [facility], [address]   Hours: Monday- Friday 7a-3p, Saturday and Sunday 7a-10a    PAT Phone Number: [phone]            Patient Information Regarding COVID Restrictions    Patients are advised to self-quarantine after COVID testing up to the day of the scheduled procedure. Day of Procedure     Please park in the parking deck or any designated visitor parking lot.  Enter the facility through the Main Entrance of the hospital.   A temperature check and appropriate symptom/exposure screening will be done prior to entry to the facility.  On the day of surgery, please provide the cell phone number of the person who will be waiting for you to the Patient Access representative at the time of registration.  Please wear a mask on the day of your procedure.  We are now allowing one designated visitor per stay. Pediatric patients may have 2 designated visitors. This one person may come in with you on the day of your procedure.  No visitors under the age of 13.  The designated visitor must also wear a mask.  Once your procedure and the immediate recovery period is completed, a nurse in the recovery area will contact your designated visitor to inform them of your room number or to otherwise review other pertinent information regarding your care.  Social distancing practices are to be adhered to in waiting areas and the cafeteria. The patient was contacted  in person. SHE verbalize  understanding of all instructions does not  need reinforcement.

## 2021-12-11 LAB
BACTERIA SPEC CULT: NORMAL
BACTERIA SPEC CULT: NORMAL
SARS-COV-2, XPLCVT: NOT DETECTED
SERVICE CMNT-IMP: NORMAL
SOURCE, COVRS: NORMAL

## 2021-12-12 LAB
ATRIAL RATE: 66 BPM
CALCULATED P AXIS, ECG09: 62 DEGREES
CALCULATED R AXIS, ECG10: 17 DEGREES
CALCULATED T AXIS, ECG11: 50 DEGREES
DIAGNOSIS, 93000: NORMAL
P-R INTERVAL, ECG05: 182 MS
Q-T INTERVAL, ECG07: 440 MS
QRS DURATION, ECG06: 96 MS
QTC CALCULATION (BEZET), ECG08: 461 MS
VENTRICULAR RATE, ECG03: 66 BPM

## 2021-12-13 ENCOUNTER — TELEPHONE (OUTPATIENT)
Dept: ORTHOPEDIC SURGERY | Age: 51
End: 2021-12-13

## 2021-12-13 LAB
ABO + RH BLD: NORMAL
BLOOD GROUP ANTIBODIES SERPL: NORMAL
SPECIMEN EXP DATE BLD: NORMAL

## 2021-12-13 NOTE — PERIOP NOTES
PAT Nurse Practitioner   Pre-Operative Chart Review/Assessment:-ORTHOPEDIC/NEUROSURGICAL SPINE                Patient Name:  Modesta Oneal                                                           Age:   44 y.o.    :  1970     Today's Date:  2021     Date of PAT:   12/10/2021      Date of Surgery:    12/15/2021      Procedure(s):  L3-L5 Revision Laminectomy and Fusion     Surgeon:   Dr. Ronald Morris                       PLAN:       1)  PCP: Dr. Amparo Lee        2)  Cardiac Clearance: Pt followed by Dr. Rabia Xie. Cardiac clearance obtained. [facility] report below. OV notes, stress test, EKG on chart. 3)  Diabetic Treatment Consult: Not indicated. A1c-5.7       4)  Sleep Apnea evaluation:  + MARYANNE dx. Pt uses CPAP. 5)  Treatment for MRSA/Staph Aureus: Neg       6)  Additional Concerns: Current 1 PPD smoker, HTN, GERD, depression, obesity              Vital Signs:         Vitals:    12/10/21 1545   Pulse: 67   Resp: 18   Temp: 97.9 °F (36.6 °C)   SpO2: 98%   Weight: 119.8 kg (264 lb 1.8 oz)   Height: 5' 5\" (1.651 m)          Preoperative Nutrition Screen (XIOMARA)   Patient's Age: 44 y.o. Patient's BMI: Estimated body mass index is 43.95 kg/m² as calculated from the following:    Height as of this encounter: 5' 5\" (1.651 m). Weight as of this encounter: 119.8 kg (264 lb 1.8 oz). If the answer to any of the following is Yes, then recommend prescribe Oral Nutrition Supplements (ONS) for at least 5 days prior to surgery and/or order referral to dietitian for further assessment and nutrition therapy. 1. Does the patient have a documented serum albumin less than 3.0 within the last 90 days? Unknown = 0       2. Is patient's BMI less than 18.5 (or less than 20 if age over 72)? No = 0        3. Has the patient had an unplanned weight loss of 10% of body weight or more in the last 6 months? No = 0   4.  Has the patient been eating less than 50% of their normal diet in the preceding week? No = 0   XIOMARA Score (number of Yes responses), 0-4 0     Plan:   2 daily immuno nutrition shakes, 5 days prior to surgery and 5 days post-operatively. 3 pre-surgery drinks. Electronically signed by Vergil Lombard, NP on 21 at 10:46 AM    ____________________________________________  PAST MEDICAL HISTORY  Past Medical History:   Diagnosis Date    Chronic pain     back-low down right leg to foot    GERD (gastroesophageal reflux disease)     Hypertension     Psychiatric disorder     DEPRESSION    Sleep apnea     USES CPAP      ____________________________________________  PAST SURGICAL HISTORY  Past Surgical History:   Procedure Laterality Date    HX BREAST BIOPSY Right     benign core bx    HX CHOLECYSTECTOMY      HX GYN       x 2    HX HERNIA REPAIR  683    umbilical    HX HYSTERECTOMY      HX LUMBAR FUSION      L4-L5     IR INJ FORAMIN EPID LUMB ANES/STER SNGL  9/15/2020     ____________________________________________  HOME MEDICATIONS    Current Outpatient Medications   Medication Sig    AMLODIPINE BESYLATE, BULK, Take 10 mg by mouth daily.  irbesartan-hydroCHLOROthiazide (AVALIDE) 300-12.5 mg per tablet Take 1 Tablet by mouth daily.  omeprazole (PriLOSEC OTC) 20 mg tablet Take 20 mg by mouth daily.  methylphenidate ER 36 mg 24 hr tab Take 36 mg by mouth as needed.  gabapentin (NEURONTIN) 300 mg capsule Take 300 mg by mouth three (3) times daily.  metoprolol succinate (TOPROL-XL) 100 mg tablet Take 1 Tablet by mouth daily.  DULoxetine (CYMBALTA) 30 mg capsule Take 90 mg by mouth daily.      No current facility-administered medications for this encounter.      ____________________________________________  ALLERGIES  Allergies   Allergen Reactions    Augmentin [Amoxicillin-Pot Clavulanate] Nausea and Vomiting      ____________________________________________  SOCIAL HISTORY  Social History     Tobacco Use    Smoking status: Current Every Day Smoker Packs/day: 1.00     Years: 20.00     Pack years: 20.00    Smokeless tobacco: Never Used   Substance Use Topics    Alcohol use: Yes     Comment: social      ____________________________________________   Internal Administration   First Dose COVID-19, [address], Primary or Immunocompromised Series, MRNA, PF, 100mcg/0.5mL  10/06/2021   Second Dose COVID-19, [address], Primary or Immunocompromised Series, MRNA, PF, 100mcg/0.5mL  11/03/2021      Last COVID Lab SARS-CoV-2 ( )   Date Value   12/10/2021 Not detected                    ____________________________________________        Labs:     Hospital Outpatient Visit on 12/10/2021   Component Date Value Ref Range Status    Sodium 12/10/2021 136  136 - 145 mmol/L Final    Potassium 12/10/2021 4.2  3.5 - 5.1 mmol/L Final    Chloride 12/10/2021 105  97 - 108 mmol/L Final    CO2 12/10/2021 27  21 - 32 mmol/L Final    Anion gap 12/10/2021 4* 5 - 15 mmol/L Final    Glucose 12/10/2021 90  65 - 100 mg/dL Final    BUN 12/10/2021 12  6 - 20 MG/DL Final    Creatinine 12/10/2021 0.75  0.55 - 1.02 MG/DL Final    BUN/Creatinine ratio 12/10/2021 16  12 - 20   Final    GFR est AA 12/10/2021 >60  >60 ml/min/1.73m2 Final    GFR est non-AA 12/10/2021 >60  >60 ml/min/1.73m2 Final    Estimated GFR is calculated using the IDMS-traceable Modification of Diet in Renal Disease (MDRD) Study equation, reported for both  Americans (GFRAA) and non- Americans (GFRNA), and normalized to 1.73m2 body surface area. The physician must decide which value applies to the patient.     Calcium 12/10/2021 9.7  8.5 - 10.1 MG/DL Final    WBC 12/10/2021 9.5  3.6 - 11.0 K/uL Final    RBC 12/10/2021 4.63  3.80 - 5.20 M/uL Final    HGB 12/10/2021 14.2  11.5 - 16.0 g/dL Final    HCT 12/10/2021 43.8  35.0 - 47.0 % Final    MCV 12/10/2021 94.6  80.0 - 99.0 FL Final    MCH 12/10/2021 30.7  26.0 - 34.0 PG Final    MCHC 12/10/2021 32.4  30.0 - 36.5 g/dL Final    RDW 12/10/2021 13.9  11.5 - 14.5 % Final    PLATELET 94/48/6781 451  150 - 400 K/uL Final    MPV 12/10/2021 11.0  8.9 - 12.9 FL Final    NRBC 12/10/2021 0.0  0  WBC Final    ABSOLUTE NRBC 12/10/2021 0.00  0.00 - 0.01 K/uL Final    Crossmatch Expiration 12/10/2021 12/13/2021,2359   Final    ABO/Rh(D) 12/10/2021 A POSITIVE   Final    Antibody screen 12/10/2021 NEG   Final    INR 12/10/2021 1.0  0.9 - 1.1   Final    A single therapeutic range for Vit K antagonists may not be optimal for all indications - see June, 2008 issue of Chest, American College of Chest Physicians Evidence-Based Clinical Practice Guidelines, 8th Edition.  Prothrombin time 12/10/2021 10.2  9.0 - 11.1 sec Final    Color 12/10/2021 YELLOW/STRAW    Final    Color Reference Range: Straw, Yellow or Dark Yellow    Appearance 12/10/2021 CLEAR  CLEAR   Final    Specific gravity 12/10/2021 1.009  1.003 - 1.030   Final    pH (UA) 12/10/2021 5.5  5.0 - 8.0   Final    Protein 12/10/2021 Negative  NEG mg/dL Final    Glucose 12/10/2021 Negative  NEG mg/dL Final    Ketone 12/10/2021 Negative  NEG mg/dL Final    Bilirubin 12/10/2021 Negative  NEG   Final    Blood 12/10/2021 Negative  NEG   Final    Urobilinogen 12/10/2021 0.2  0.2 - 1.0 EU/dL Final    Nitrites 12/10/2021 Negative  NEG   Final    Leukocyte Esterase 12/10/2021 Negative  NEG   Final    UA:UC IF INDICATED 12/10/2021 CULTURE NOT INDICATED BY UA RESULT  CNI   Final    WBC 12/10/2021 0-4  0 - 4 /hpf Final    RBC 12/10/2021 0-5  0 - 5 /hpf Final    Epithelial cells 12/10/2021 FEW  FEW /lpf Final    Epithelial cell category consists of squamous cells and /or transitional urothelial cells. Renal tubular cells, if present, are separately identified as such.     Bacteria 12/10/2021 Negative  NEG /hpf Final    Hyaline cast 12/10/2021 0-2  0 - 5 /lpf Final    Ventricular Rate 12/10/2021 66  BPM Final    Atrial Rate 12/10/2021 66  BPM Final    P-R Interval 12/10/2021 182  ms Final    QRS Duration 12/10/2021 96  ms Final    Q-T Interval 12/10/2021 440  ms Final    QTC Calculation (Bezet) 12/10/2021 461  ms Final    Calculated P Axis 12/10/2021 62  degrees Final    Calculated R Axis 12/10/2021 17  degrees Final    Calculated T Axis 12/10/2021 50  degrees Final    Diagnosis 12/10/2021    Final                    Value:Normal sinus rhythm  Nonspecific ST abnormality  When compared with ECG of 16-JUL-2009 11:53,  Nonspecific T wave abnormality now evident in Lateral leads  Confirmed by Reinaldo Suresh MD. ([id number]) on 12/12/2021 10:49:55 PM      Hemoglobin A1c 12/10/2021 5.7* 4.0 - 5.6 % Final    Comment: NEW METHOD  PLEASE NOTE NEW REFERENCE RANGE  (NOTE)  HbA1C Interpretive Ranges  <5.7              Normal  5.7 - 6.4         Consider Prediabetes  >6.5              Consider Diabetes      Est. average glucose 12/10/2021 117  mg/dL Final    Special Requests: 12/10/2021 NO SPECIAL REQUESTS    Final    Culture result: 12/10/2021 MRSA NOT PRESENT    Final           Hospital Outpatient Visit on 12/10/2021   Component Date Value Ref Range Status    Specimen source 12/10/2021 Nasopharyngeal    Final    SARS-CoV-2 12/10/2021 Not detected  NOTD   Final    Comment:      The specimen is NEGATIVE for SARS-CoV-2, the novel coronavirus associated with COVID-19. A negative result does not rule out COVID-19. Mitchel SARS-CoV-2 for use on the [employer] 6800/8800 Systems is a real-time RT-PCR test intended for the qualitative detection of nucleic acids from SARS-CoV-2  in clinician-collected nasal, nasopharyngeal,and oropharyngeal swab specimens from individuals who meet COVID-19 clinical and/or epidemiological criteria. Mitchel SARS-CoV-2 is for use only under Emergency Use Authorization (EUA) in laboratories certified under [address] (CLIA), 42 U. S.C. 754S, that meet requirements to perform high or moderate complexity tests.        An individual without symptoms of COVID-19 and who is not shedding SARS-CoV-2 virus would expect to have a negative (not detected) result in this assay. Fact sheet for Healthcare Providers: ConventionUpdate.co.nz  Fact sheet for Patients: http://www.[employer]/                           45256/download       Methodology: RT-PCR         09/17/21    CARDIAC PROCEDURE 09/20/2021 9/20/2021    Conclusion  · Ultrasound guided R radial artery access  · Normal coronary arteries. Left dominant system  · Normal LV end diasstolic filling pressure: 12 mmHg  · Hyperdynamic LV systolic function: EF > 79% with mid cavity obliteration in systole  · Severe LVOT obstruction: 70 mmHg with Valsalva and 100 mmHg post PVC ([facility] phenomen)    Signed by: Rachelle Robles MD on 9/20/2021  2:16 PM        Skin:     Denies open wounds, cuts, sores, rashes or other areas of concern in PAT assessment.         Aileen Marquez, NP

## 2021-12-14 ENCOUNTER — ANESTHESIA EVENT (OUTPATIENT)
Dept: SURGERY | Age: 51
DRG: 454 | End: 2021-12-14
Payer: COMMERCIAL

## 2021-12-15 ENCOUNTER — APPOINTMENT (OUTPATIENT)
Dept: GENERAL RADIOLOGY | Age: 51
DRG: 454 | End: 2021-12-15
Attending: ORTHOPAEDIC SURGERY
Payer: COMMERCIAL

## 2021-12-15 ENCOUNTER — HOSPITAL ENCOUNTER (INPATIENT)
Age: 51
LOS: 2 days | Discharge: HOME | DRG: 454 | End: 2021-12-17
Attending: ORTHOPAEDIC SURGERY | Admitting: ORTHOPAEDIC SURGERY
Payer: COMMERCIAL

## 2021-12-15 ENCOUNTER — ANESTHESIA (OUTPATIENT)
Dept: SURGERY | Age: 51
DRG: 454 | End: 2021-12-15
Payer: COMMERCIAL

## 2021-12-15 DIAGNOSIS — Z98.1 S/P LUMBAR FUSION: Primary | ICD-10-CM

## 2021-12-15 DIAGNOSIS — M43.16 SPONDYLOLISTHESIS OF LUMBAR REGION: ICD-10-CM

## 2021-12-15 DIAGNOSIS — M48.062 LUMBAR STENOSIS WITH NEUROGENIC CLAUDICATION: ICD-10-CM

## 2021-12-15 LAB
GLUCOSE BLD STRIP.AUTO-MCNC: 87 MG/DL (ref 65–117)
SERVICE CMNT-IMP: NORMAL

## 2021-12-15 PROCEDURE — 74011250636 HC RX REV CODE- 250/636: Performed by: PHYSICIAN ASSISTANT

## 2021-12-15 PROCEDURE — 0SG1071 FUSION OF 2 OR MORE LUMBAR VERTEBRAL JOINTS WITH AUTOLOGOUS TISSUE SUBSTITUTE, POSTERIOR APPROACH, POSTERIOR COLUMN, OPEN APPROACH: ICD-10-PCS | Performed by: ORTHOPAEDIC SURGERY

## 2021-12-15 PROCEDURE — 0SG00AJ FUSION OF LUMBAR VERTEBRAL JOINT WITH INTERBODY FUSION DEVICE, POSTERIOR APPROACH, ANTERIOR COLUMN, OPEN APPROACH: ICD-10-PCS | Performed by: ORTHOPAEDIC SURGERY

## 2021-12-15 PROCEDURE — 22633 ARTHRD CMBN 1NTRSPC LUMBAR: CPT | Performed by: PHYSICIAN ASSISTANT

## 2021-12-15 PROCEDURE — 22853 INSJ BIOMECHANICAL DEVICE: CPT | Performed by: ORTHOPAEDIC SURGERY

## 2021-12-15 PROCEDURE — 0ST20ZZ RESECTION OF LUMBAR VERTEBRAL DISC, OPEN APPROACH: ICD-10-PCS | Performed by: ORTHOPAEDIC SURGERY

## 2021-12-15 PROCEDURE — 22214 INCIS 1 VERTEBRAL SEG LUMBAR: CPT | Performed by: ORTHOPAEDIC SURGERY

## 2021-12-15 PROCEDURE — 77030026438 HC STYL ET INTUB CARD -A: Performed by: ANESTHESIOLOGY

## 2021-12-15 PROCEDURE — 74011000250 HC RX REV CODE- 250: Performed by: ORTHOPAEDIC SURGERY

## 2021-12-15 PROCEDURE — 63047 LAM FACETEC & FORAMOT LUMBAR: CPT | Performed by: PHYSICIAN ASSISTANT

## 2021-12-15 PROCEDURE — 77030031139 HC SUT VCRL2 J&J -A: Performed by: ORTHOPAEDIC SURGERY

## 2021-12-15 PROCEDURE — 22842 INSERT SPINE FIXATION DEVICE: CPT | Performed by: ORTHOPAEDIC SURGERY

## 2021-12-15 PROCEDURE — 22842 INSERT SPINE FIXATION DEVICE: CPT | Performed by: PHYSICIAN ASSISTANT

## 2021-12-15 PROCEDURE — 2709999900 HC NON-CHARGEABLE SUPPLY: Performed by: ORTHOPAEDIC SURGERY

## 2021-12-15 PROCEDURE — 74011250636 HC RX REV CODE- 250/636

## 2021-12-15 PROCEDURE — 74011250637 HC RX REV CODE- 250/637: Performed by: ANESTHESIOLOGY

## 2021-12-15 PROCEDURE — C1713 ANCHOR/SCREW BN/BN,TIS/BN: HCPCS | Performed by: ORTHOPAEDIC SURGERY

## 2021-12-15 PROCEDURE — 74011250637 HC RX REV CODE- 250/637: Performed by: PHYSICIAN ASSISTANT

## 2021-12-15 PROCEDURE — 77030034475 HC MISC IMPL SPN: Performed by: ORTHOPAEDIC SURGERY

## 2021-12-15 PROCEDURE — 22853 INSJ BIOMECHANICAL DEVICE: CPT | Performed by: PHYSICIAN ASSISTANT

## 2021-12-15 PROCEDURE — 74011250636 HC RX REV CODE- 250/636: Performed by: ORTHOPAEDIC SURGERY

## 2021-12-15 PROCEDURE — 76060000038 HC ANESTHESIA 3.5 TO 4 HR: Performed by: ORTHOPAEDIC SURGERY

## 2021-12-15 PROCEDURE — 74011000250 HC RX REV CODE- 250: Performed by: NURSE ANESTHETIST, CERTIFIED REGISTERED

## 2021-12-15 PROCEDURE — 63047 LAM FACETEC & FORAMOT LUMBAR: CPT | Performed by: ORTHOPAEDIC SURGERY

## 2021-12-15 PROCEDURE — 77030008684 HC TU ET CUF COVD -B: Performed by: ANESTHESIOLOGY

## 2021-12-15 PROCEDURE — 01NB0ZZ RELEASE LUMBAR NERVE, OPEN APPROACH: ICD-10-PCS | Performed by: ORTHOPAEDIC SURGERY

## 2021-12-15 PROCEDURE — 74011250636 HC RX REV CODE- 250/636: Performed by: ANESTHESIOLOGY

## 2021-12-15 PROCEDURE — 74011000250 HC RX REV CODE- 250: Performed by: PHYSICIAN ASSISTANT

## 2021-12-15 PROCEDURE — 77030032958 HC GRFT BN SUB ELITE TRNTY MUSC -K1: Performed by: ORTHOPAEDIC SURGERY

## 2021-12-15 PROCEDURE — 77030038552 HC DRN WND MDII -A: Performed by: ORTHOPAEDIC SURGERY

## 2021-12-15 PROCEDURE — 65270000029 HC RM PRIVATE

## 2021-12-15 PROCEDURE — 74011250636 HC RX REV CODE- 250/636: Performed by: NURSE ANESTHETIST, CERTIFIED REGISTERED

## 2021-12-15 PROCEDURE — 77030019908 HC STETH ESOPH SIMS -A: Performed by: ANESTHESIOLOGY

## 2021-12-15 PROCEDURE — 77030038600 HC TU BPLR IRR DISP STRY -B: Performed by: ORTHOPAEDIC SURGERY

## 2021-12-15 PROCEDURE — 77030005513 HC CATH URETH FOL11 MDII -B: Performed by: ORTHOPAEDIC SURGERY

## 2021-12-15 PROCEDURE — 76010000173 HC OR TIME 3 TO 3.5 HR INTENSV-TIER 1: Performed by: ORTHOPAEDIC SURGERY

## 2021-12-15 PROCEDURE — 77030004391 HC BUR FLUT MEDT -C: Performed by: ORTHOPAEDIC SURGERY

## 2021-12-15 PROCEDURE — 77030014650 HC SEAL MTRX FLOSEL BAXT -C: Performed by: ORTHOPAEDIC SURGERY

## 2021-12-15 PROCEDURE — 77030029099 HC BN WAX SSPC -A: Performed by: ORTHOPAEDIC SURGERY

## 2021-12-15 PROCEDURE — 22633 ARTHRD CMBN 1NTRSPC LUMBAR: CPT | Performed by: ORTHOPAEDIC SURGERY

## 2021-12-15 PROCEDURE — 82962 GLUCOSE BLOOD TEST: CPT

## 2021-12-15 PROCEDURE — 76210000017 HC OR PH I REC 1.5 TO 2 HR: Performed by: ORTHOPAEDIC SURGERY

## 2021-12-15 PROCEDURE — 74011250637 HC RX REV CODE- 250/637: Performed by: NURSE ANESTHETIST, CERTIFIED REGISTERED

## 2021-12-15 PROCEDURE — 51798 US URINE CAPACITY MEASURE: CPT

## 2021-12-15 PROCEDURE — 0SP004Z REMOVAL OF INTERNAL FIXATION DEVICE FROM LUMBAR VERTEBRAL JOINT, OPEN APPROACH: ICD-10-PCS | Performed by: ORTHOPAEDIC SURGERY

## 2021-12-15 PROCEDURE — 72100 X-RAY EXAM L-S SPINE 2/3 VWS: CPT

## 2021-12-15 PROCEDURE — 22214 INCIS 1 VERTEBRAL SEG LUMBAR: CPT | Performed by: PHYSICIAN ASSISTANT

## 2021-12-15 PROCEDURE — 20936 SP BONE AGRFT LOCAL ADD-ON: CPT | Performed by: ORTHOPAEDIC SURGERY

## 2021-12-15 DEVICE — SET SCREW 5540030 5.5 TI NS BRK OFF
Type: IMPLANTABLE DEVICE | Site: SPINE LUMBAR | Status: FUNCTIONAL
Brand: CD HORIZON® SPINAL SYSTEM

## 2021-12-15 DEVICE — GRAFT BNE SUB L CANC FRZN MORSELIZED W/ VIABLE CELL TRINITY: Type: IMPLANTABLE DEVICE | Site: SPINE LUMBAR | Status: FUNCTIONAL

## 2021-12-15 DEVICE — GRAFT BNE SUB 15GM GRN CA COMPND PTTY AND SILICATE BASE INJ: Type: IMPLANTABLE DEVICE | Site: SPINE LUMBAR | Status: FUNCTIONAL

## 2021-12-15 DEVICE — SPACER 6068073 CATALYFT PL SHORT 7MM
Type: IMPLANTABLE DEVICE | Site: SPINE LUMBAR | Status: FUNCTIONAL
Brand: CATALYFT PL EXPANDABLE INTERBODY SYSTEM

## 2021-12-15 RX ORDER — MORPHINE SULFATE IN 0.9 % NACL 150MG/30ML
PATIENT CONTROLLED ANALGESIA SYRINGE INTRAVENOUS
Status: DISCONTINUED | OUTPATIENT
Start: 2021-12-15 | End: 2021-12-16

## 2021-12-15 RX ORDER — FENTANYL CITRATE 50 UG/ML
25 INJECTION, SOLUTION INTRAMUSCULAR; INTRAVENOUS
Status: DISCONTINUED | OUTPATIENT
Start: 2021-12-15 | End: 2021-12-15 | Stop reason: HOSPADM

## 2021-12-15 RX ORDER — LOSARTAN POTASSIUM 50 MG/1
100 TABLET ORAL DAILY
Status: DISCONTINUED | OUTPATIENT
Start: 2021-12-16 | End: 2021-12-17 | Stop reason: HOSPADM

## 2021-12-15 RX ORDER — FACIAL-BODY WIPES
10 EACH TOPICAL DAILY PRN
Status: DISCONTINUED | OUTPATIENT
Start: 2021-12-17 | End: 2021-12-17 | Stop reason: HOSPADM

## 2021-12-15 RX ORDER — SODIUM CHLORIDE, SODIUM LACTATE, POTASSIUM CHLORIDE, CALCIUM CHLORIDE 600; 310; 30; 20 MG/100ML; MG/100ML; MG/100ML; MG/100ML
100 INJECTION, SOLUTION INTRAVENOUS CONTINUOUS
Status: DISCONTINUED | OUTPATIENT
Start: 2021-12-15 | End: 2021-12-15 | Stop reason: HOSPADM

## 2021-12-15 RX ORDER — EPHEDRINE SULFATE/0.9% NACL/PF 50 MG/5 ML
SYRINGE (ML) INTRAVENOUS AS NEEDED
Status: DISCONTINUED | OUTPATIENT
Start: 2021-12-15 | End: 2021-12-15 | Stop reason: HOSPADM

## 2021-12-15 RX ORDER — DIPHENHYDRAMINE HYDROCHLORIDE 50 MG/ML
12.5 INJECTION, SOLUTION INTRAMUSCULAR; INTRAVENOUS
Status: ACTIVE | OUTPATIENT
Start: 2021-12-15 | End: 2021-12-16

## 2021-12-15 RX ORDER — KETAMINE HYDROCHLORIDE 100 MG/ML
INJECTION, SOLUTION INTRAMUSCULAR; INTRAVENOUS AS NEEDED
Status: DISCONTINUED | OUTPATIENT
Start: 2021-12-15 | End: 2021-12-15 | Stop reason: HOSPADM

## 2021-12-15 RX ORDER — SODIUM CHLORIDE 9 MG/ML
25 INJECTION, SOLUTION INTRAVENOUS CONTINUOUS
Status: DISCONTINUED | OUTPATIENT
Start: 2021-12-15 | End: 2021-12-15 | Stop reason: HOSPADM

## 2021-12-15 RX ORDER — SODIUM CHLORIDE 0.9 % (FLUSH) 0.9 %
5-40 SYRINGE (ML) INJECTION AS NEEDED
Status: DISCONTINUED | OUTPATIENT
Start: 2021-12-15 | End: 2021-12-15 | Stop reason: HOSPADM

## 2021-12-15 RX ORDER — SODIUM CHLORIDE, SODIUM LACTATE, POTASSIUM CHLORIDE, CALCIUM CHLORIDE 600; 310; 30; 20 MG/100ML; MG/100ML; MG/100ML; MG/100ML
INJECTION, SOLUTION INTRAVENOUS
Status: DISCONTINUED | OUTPATIENT
Start: 2021-12-15 | End: 2021-12-15 | Stop reason: HOSPADM

## 2021-12-15 RX ORDER — ACETAMINOPHEN 500 MG
1000 TABLET ORAL EVERY 6 HOURS
Status: DISCONTINUED | OUTPATIENT
Start: 2021-12-15 | End: 2021-12-17 | Stop reason: HOSPADM

## 2021-12-15 RX ORDER — MIDAZOLAM HYDROCHLORIDE 1 MG/ML
1 INJECTION, SOLUTION INTRAMUSCULAR; INTRAVENOUS AS NEEDED
Status: DISCONTINUED | OUTPATIENT
Start: 2021-12-15 | End: 2021-12-15 | Stop reason: HOSPADM

## 2021-12-15 RX ORDER — HYDROMORPHONE HYDROCHLORIDE 1 MG/ML
0.5 INJECTION, SOLUTION INTRAMUSCULAR; INTRAVENOUS; SUBCUTANEOUS
Status: DISCONTINUED | OUTPATIENT
Start: 2021-12-15 | End: 2021-12-15 | Stop reason: HOSPADM

## 2021-12-15 RX ORDER — ONDANSETRON 2 MG/ML
INJECTION INTRAMUSCULAR; INTRAVENOUS AS NEEDED
Status: DISCONTINUED | OUTPATIENT
Start: 2021-12-15 | End: 2021-12-15 | Stop reason: HOSPADM

## 2021-12-15 RX ORDER — SODIUM CHLORIDE 0.9 % (FLUSH) 0.9 %
5-40 SYRINGE (ML) INJECTION AS NEEDED
Status: DISCONTINUED | OUTPATIENT
Start: 2021-12-15 | End: 2021-12-17 | Stop reason: HOSPADM

## 2021-12-15 RX ORDER — DEXAMETHASONE SODIUM PHOSPHATE 4 MG/ML
INJECTION, SOLUTION INTRA-ARTICULAR; INTRALESIONAL; INTRAMUSCULAR; INTRAVENOUS; SOFT TISSUE AS NEEDED
Status: DISCONTINUED | OUTPATIENT
Start: 2021-12-15 | End: 2021-12-15 | Stop reason: HOSPADM

## 2021-12-15 RX ORDER — OXYCODONE HYDROCHLORIDE 5 MG/1
5 TABLET ORAL
Status: DISCONTINUED | OUTPATIENT
Start: 2021-12-15 | End: 2021-12-17 | Stop reason: HOSPADM

## 2021-12-15 RX ORDER — MORPHINE SULFATE 2 MG/ML
2 INJECTION, SOLUTION INTRAMUSCULAR; INTRAVENOUS
Status: DISCONTINUED | OUTPATIENT
Start: 2021-12-15 | End: 2021-12-15 | Stop reason: HOSPADM

## 2021-12-15 RX ORDER — OXYCODONE HYDROCHLORIDE 5 MG/1
10 TABLET ORAL
Status: DISCONTINUED | OUTPATIENT
Start: 2021-12-15 | End: 2021-12-17 | Stop reason: HOSPADM

## 2021-12-15 RX ORDER — ALBUTEROL SULFATE 90 UG/1
AEROSOL, METERED RESPIRATORY (INHALATION) AS NEEDED
Status: DISCONTINUED | OUTPATIENT
Start: 2021-12-15 | End: 2021-12-15 | Stop reason: HOSPADM

## 2021-12-15 RX ORDER — HYDROMORPHONE HYDROCHLORIDE 2 MG/ML
INJECTION, SOLUTION INTRAMUSCULAR; INTRAVENOUS; SUBCUTANEOUS AS NEEDED
Status: DISCONTINUED | OUTPATIENT
Start: 2021-12-15 | End: 2021-12-15 | Stop reason: HOSPADM

## 2021-12-15 RX ORDER — FENTANYL CITRATE 50 UG/ML
INJECTION, SOLUTION INTRAMUSCULAR; INTRAVENOUS AS NEEDED
Status: DISCONTINUED | OUTPATIENT
Start: 2021-12-15 | End: 2021-12-15 | Stop reason: HOSPADM

## 2021-12-15 RX ORDER — SUCCINYLCHOLINE CHLORIDE 20 MG/ML
INJECTION INTRAMUSCULAR; INTRAVENOUS AS NEEDED
Status: DISCONTINUED | OUTPATIENT
Start: 2021-12-15 | End: 2021-12-15 | Stop reason: HOSPADM

## 2021-12-15 RX ORDER — HYDROCHLOROTHIAZIDE 25 MG/1
12.5 TABLET ORAL DAILY
Status: DISCONTINUED | OUTPATIENT
Start: 2021-12-16 | End: 2021-12-17 | Stop reason: HOSPADM

## 2021-12-15 RX ORDER — MORPHINE SULFATE 2 MG/ML
2 INJECTION, SOLUTION INTRAMUSCULAR; INTRAVENOUS
Status: ACTIVE | OUTPATIENT
Start: 2021-12-15 | End: 2021-12-16

## 2021-12-15 RX ORDER — DIPHENHYDRAMINE HYDROCHLORIDE 50 MG/ML
12.5 INJECTION, SOLUTION INTRAMUSCULAR; INTRAVENOUS AS NEEDED
Status: DISCONTINUED | OUTPATIENT
Start: 2021-12-15 | End: 2021-12-15 | Stop reason: HOSPADM

## 2021-12-15 RX ORDER — METOPROLOL SUCCINATE 50 MG/1
100 TABLET, EXTENDED RELEASE ORAL DAILY
Status: DISCONTINUED | OUTPATIENT
Start: 2021-12-16 | End: 2021-12-17 | Stop reason: HOSPADM

## 2021-12-15 RX ORDER — MIDAZOLAM HYDROCHLORIDE 1 MG/ML
INJECTION, SOLUTION INTRAMUSCULAR; INTRAVENOUS AS NEEDED
Status: DISCONTINUED | OUTPATIENT
Start: 2021-12-15 | End: 2021-12-15 | Stop reason: HOSPADM

## 2021-12-15 RX ORDER — DEXMEDETOMIDINE HYDROCHLORIDE 100 UG/ML
INJECTION, SOLUTION INTRAVENOUS AS NEEDED
Status: DISCONTINUED | OUTPATIENT
Start: 2021-12-15 | End: 2021-12-15 | Stop reason: HOSPADM

## 2021-12-15 RX ORDER — CYCLOBENZAPRINE HCL 10 MG
10 TABLET ORAL
Status: DISCONTINUED | OUTPATIENT
Start: 2021-12-15 | End: 2021-12-17 | Stop reason: HOSPADM

## 2021-12-15 RX ORDER — ONDANSETRON 2 MG/ML
4 INJECTION INTRAMUSCULAR; INTRAVENOUS
Status: ACTIVE | OUTPATIENT
Start: 2021-12-15 | End: 2021-12-16

## 2021-12-15 RX ORDER — PANTOPRAZOLE SODIUM 40 MG/1
40 TABLET, DELAYED RELEASE ORAL
Status: DISCONTINUED | OUTPATIENT
Start: 2021-12-16 | End: 2021-12-17 | Stop reason: HOSPADM

## 2021-12-15 RX ORDER — ACETAMINOPHEN 325 MG/1
650 TABLET ORAL ONCE
Status: COMPLETED | OUTPATIENT
Start: 2021-12-15 | End: 2021-12-15

## 2021-12-15 RX ORDER — NALOXONE HYDROCHLORIDE 0.4 MG/ML
0.4 INJECTION, SOLUTION INTRAMUSCULAR; INTRAVENOUS; SUBCUTANEOUS AS NEEDED
Status: DISCONTINUED | OUTPATIENT
Start: 2021-12-15 | End: 2021-12-17 | Stop reason: HOSPADM

## 2021-12-15 RX ORDER — AMLODIPINE BESYLATE 5 MG/1
10 TABLET ORAL DAILY
Status: DISCONTINUED | OUTPATIENT
Start: 2021-12-16 | End: 2021-12-17 | Stop reason: HOSPADM

## 2021-12-15 RX ORDER — ROCURONIUM BROMIDE 10 MG/ML
INJECTION, SOLUTION INTRAVENOUS AS NEEDED
Status: DISCONTINUED | OUTPATIENT
Start: 2021-12-15 | End: 2021-12-15 | Stop reason: HOSPADM

## 2021-12-15 RX ORDER — VANCOMYCIN HYDROCHLORIDE 1 G/20ML
INJECTION, POWDER, LYOPHILIZED, FOR SOLUTION INTRAVENOUS AS NEEDED
Status: DISCONTINUED | OUTPATIENT
Start: 2021-12-15 | End: 2021-12-15 | Stop reason: HOSPADM

## 2021-12-15 RX ORDER — SODIUM CHLORIDE 0.9 % (FLUSH) 0.9 %
5-40 SYRINGE (ML) INJECTION EVERY 8 HOURS
Status: DISCONTINUED | OUTPATIENT
Start: 2021-12-15 | End: 2021-12-15 | Stop reason: HOSPADM

## 2021-12-15 RX ORDER — LIDOCAINE HYDROCHLORIDE 20 MG/ML
INJECTION, SOLUTION EPIDURAL; INFILTRATION; INTRACAUDAL; PERINEURAL AS NEEDED
Status: DISCONTINUED | OUTPATIENT
Start: 2021-12-15 | End: 2021-12-15 | Stop reason: HOSPADM

## 2021-12-15 RX ORDER — GABAPENTIN 300 MG/1
300 CAPSULE ORAL 3 TIMES DAILY
Status: DISCONTINUED | OUTPATIENT
Start: 2021-12-15 | End: 2021-12-17 | Stop reason: HOSPADM

## 2021-12-15 RX ORDER — POLYETHYLENE GLYCOL 3350 17 G/17G
17 POWDER, FOR SOLUTION ORAL DAILY
Status: DISCONTINUED | OUTPATIENT
Start: 2021-12-16 | End: 2021-12-17 | Stop reason: HOSPADM

## 2021-12-15 RX ORDER — MORPHINE SULFATE IN 0.9 % NACL 150MG/30ML
PATIENT CONTROLLED ANALGESIA SYRINGE INTRAVENOUS
Status: COMPLETED
Start: 2021-12-15 | End: 2021-12-15

## 2021-12-15 RX ORDER — MIDAZOLAM HYDROCHLORIDE 1 MG/ML
0.5 INJECTION, SOLUTION INTRAMUSCULAR; INTRAVENOUS
Status: COMPLETED | OUTPATIENT
Start: 2021-12-15 | End: 2021-12-15

## 2021-12-15 RX ORDER — ROPIVACAINE HYDROCHLORIDE 5 MG/ML
30 INJECTION, SOLUTION EPIDURAL; INFILTRATION; PERINEURAL AS NEEDED
Status: DISCONTINUED | OUTPATIENT
Start: 2021-12-15 | End: 2021-12-15 | Stop reason: HOSPADM

## 2021-12-15 RX ORDER — LIDOCAINE HYDROCHLORIDE 10 MG/ML
0.1 INJECTION, SOLUTION EPIDURAL; INFILTRATION; INTRACAUDAL; PERINEURAL AS NEEDED
Status: DISCONTINUED | OUTPATIENT
Start: 2021-12-15 | End: 2021-12-15 | Stop reason: HOSPADM

## 2021-12-15 RX ORDER — AMOXICILLIN 250 MG
1 CAPSULE ORAL 2 TIMES DAILY
Status: DISCONTINUED | OUTPATIENT
Start: 2021-12-15 | End: 2021-12-17 | Stop reason: HOSPADM

## 2021-12-15 RX ORDER — KETOROLAC TROMETHAMINE 30 MG/ML
30 INJECTION, SOLUTION INTRAMUSCULAR; INTRAVENOUS EVERY 6 HOURS
Status: COMPLETED | OUTPATIENT
Start: 2021-12-15 | End: 2021-12-16

## 2021-12-15 RX ORDER — FENTANYL CITRATE 50 UG/ML
50 INJECTION, SOLUTION INTRAMUSCULAR; INTRAVENOUS AS NEEDED
Status: DISCONTINUED | OUTPATIENT
Start: 2021-12-15 | End: 2021-12-15 | Stop reason: HOSPADM

## 2021-12-15 RX ORDER — PROPOFOL 10 MG/ML
INJECTION, EMULSION INTRAVENOUS AS NEEDED
Status: DISCONTINUED | OUTPATIENT
Start: 2021-12-15 | End: 2021-12-15 | Stop reason: HOSPADM

## 2021-12-15 RX ORDER — ONDANSETRON 2 MG/ML
4 INJECTION INTRAMUSCULAR; INTRAVENOUS AS NEEDED
Status: DISCONTINUED | OUTPATIENT
Start: 2021-12-15 | End: 2021-12-15 | Stop reason: HOSPADM

## 2021-12-15 RX ORDER — SODIUM CHLORIDE 0.9 % (FLUSH) 0.9 %
5-40 SYRINGE (ML) INJECTION EVERY 8 HOURS
Status: DISCONTINUED | OUTPATIENT
Start: 2021-12-15 | End: 2021-12-17 | Stop reason: HOSPADM

## 2021-12-15 RX ORDER — PHENYLEPHRINE HCL IN 0.9% NACL 0.4MG/10ML
SYRINGE (ML) INTRAVENOUS AS NEEDED
Status: DISCONTINUED | OUTPATIENT
Start: 2021-12-15 | End: 2021-12-15 | Stop reason: HOSPADM

## 2021-12-15 RX ORDER — SODIUM CHLORIDE 9 MG/ML
125 INJECTION, SOLUTION INTRAVENOUS CONTINUOUS
Status: DISPENSED | OUTPATIENT
Start: 2021-12-15 | End: 2021-12-16

## 2021-12-15 RX ADMIN — Medication 10 MG: at 08:39

## 2021-12-15 RX ADMIN — SODIUM CHLORIDE, POTASSIUM CHLORIDE, SODIUM LACTATE AND CALCIUM CHLORIDE: 600; 310; 30; 20 INJECTION, SOLUTION INTRAVENOUS at 10:30

## 2021-12-15 RX ADMIN — KETAMINE HYDROCHLORIDE 25 MG: 100 INJECTION INTRAMUSCULAR; INTRAVENOUS at 08:24

## 2021-12-15 RX ADMIN — GABAPENTIN 300 MG: 300 CAPSULE ORAL at 15:04

## 2021-12-15 RX ADMIN — Medication 80 MCG: at 08:31

## 2021-12-15 RX ADMIN — DEXMEDETOMIDINE HYDROCHLORIDE 6 MCG: 100 INJECTION, SOLUTION, CONCENTRATE INTRAVENOUS at 10:57

## 2021-12-15 RX ADMIN — FENTANYL CITRATE 100 MCG: 50 INJECTION, SOLUTION INTRAMUSCULAR; INTRAVENOUS at 07:37

## 2021-12-15 RX ADMIN — ACETAMINOPHEN 1000 MG: 500 TABLET ORAL at 17:01

## 2021-12-15 RX ADMIN — ROCURONIUM BROMIDE 10 MG: 10 SOLUTION INTRAVENOUS at 08:30

## 2021-12-15 RX ADMIN — LIDOCAINE HYDROCHLORIDE 100 MG: 20 INJECTION, SOLUTION EPIDURAL; INFILTRATION; INTRACAUDAL; PERINEURAL at 07:37

## 2021-12-15 RX ADMIN — ONDANSETRON HYDROCHLORIDE 4 MG: 2 INJECTION, SOLUTION INTRAMUSCULAR; INTRAVENOUS at 10:08

## 2021-12-15 RX ADMIN — DEXAMETHASONE SODIUM PHOSPHATE 4 MG: 4 INJECTION, SOLUTION INTRAMUSCULAR; INTRAVENOUS at 08:05

## 2021-12-15 RX ADMIN — GABAPENTIN 300 MG: 300 CAPSULE ORAL at 20:55

## 2021-12-15 RX ADMIN — HYDROMORPHONE HYDROCHLORIDE 1 MG: 2 INJECTION, SOLUTION INTRAMUSCULAR; INTRAVENOUS; SUBCUTANEOUS at 10:57

## 2021-12-15 RX ADMIN — MIDAZOLAM 0.5 MG: 1 INJECTION INTRAMUSCULAR; INTRAVENOUS at 11:47

## 2021-12-15 RX ADMIN — DEXMEDETOMIDINE HYDROCHLORIDE 4 MCG: 100 INJECTION, SOLUTION, CONCENTRATE INTRAVENOUS at 10:34

## 2021-12-15 RX ADMIN — PROPOFOL 50 MG: 10 INJECTION, EMULSION INTRAVENOUS at 08:24

## 2021-12-15 RX ADMIN — ROCURONIUM BROMIDE 5 MG: 10 SOLUTION INTRAVENOUS at 07:37

## 2021-12-15 RX ADMIN — SODIUM CHLORIDE, POTASSIUM CHLORIDE, SODIUM LACTATE AND CALCIUM CHLORIDE 100 ML/HR: 600; 310; 30; 20 INJECTION, SOLUTION INTRAVENOUS at 06:35

## 2021-12-15 RX ADMIN — ROCURONIUM BROMIDE 20 MG: 10 SOLUTION INTRAVENOUS at 07:44

## 2021-12-15 RX ADMIN — KETOROLAC TROMETHAMINE 30 MG: 30 INJECTION, SOLUTION INTRAMUSCULAR; INTRAVENOUS at 17:02

## 2021-12-15 RX ADMIN — PROPOFOL 200 MG: 10 INJECTION, EMULSION INTRAVENOUS at 07:37

## 2021-12-15 RX ADMIN — Medication 10 ML: at 15:00

## 2021-12-15 RX ADMIN — ALBUTEROL SULFATE 3 PUFF: 90 AEROSOL, METERED RESPIRATORY (INHALATION) at 09:01

## 2021-12-15 RX ADMIN — DOCUSATE SODIUM 50 MG AND SENNOSIDES 8.6 MG 1 TABLET: 8.6; 5 TABLET, FILM COATED ORAL at 17:02

## 2021-12-15 RX ADMIN — WATER 2 G: 1 INJECTION INTRAMUSCULAR; INTRAVENOUS; SUBCUTANEOUS at 07:44

## 2021-12-15 RX ADMIN — Medication 80 MCG: at 09:23

## 2021-12-15 RX ADMIN — Medication: at 11:07

## 2021-12-15 RX ADMIN — MIDAZOLAM 0.5 MG: 1 INJECTION INTRAMUSCULAR; INTRAVENOUS at 11:52

## 2021-12-15 RX ADMIN — MIDAZOLAM 2 MG: 1 INJECTION INTRAMUSCULAR; INTRAVENOUS at 07:29

## 2021-12-15 RX ADMIN — ACETAMINOPHEN 650 MG: 325 TABLET ORAL at 06:37

## 2021-12-15 RX ADMIN — ALBUTEROL SULFATE 3 PUFF: 90 AEROSOL, METERED RESPIRATORY (INHALATION) at 09:02

## 2021-12-15 RX ADMIN — SODIUM CHLORIDE, POTASSIUM CHLORIDE, SODIUM LACTATE AND CALCIUM CHLORIDE: 600; 310; 30; 20 INJECTION, SOLUTION INTRAVENOUS at 07:29

## 2021-12-15 RX ADMIN — ROCURONIUM BROMIDE 15 MG: 10 SOLUTION INTRAVENOUS at 08:24

## 2021-12-15 RX ADMIN — SUCCINYLCHOLINE CHLORIDE 200 MG: 20 INJECTION, SOLUTION INTRAMUSCULAR; INTRAVENOUS at 07:37

## 2021-12-15 RX ADMIN — Medication 80 MCG: at 09:21

## 2021-12-15 RX ADMIN — CEFAZOLIN SODIUM 2 G: 1 INJECTION, POWDER, FOR SOLUTION INTRAMUSCULAR; INTRAVENOUS at 15:04

## 2021-12-15 RX ADMIN — SODIUM CHLORIDE 125 ML/HR: 9 INJECTION, SOLUTION INTRAVENOUS at 11:05

## 2021-12-15 RX ADMIN — DEXMEDETOMIDINE HYDROCHLORIDE 6 MCG: 100 INJECTION, SOLUTION, CONCENTRATE INTRAVENOUS at 10:30

## 2021-12-15 RX ADMIN — ROCURONIUM BROMIDE 50 MG: 10 SOLUTION INTRAVENOUS at 09:07

## 2021-12-15 RX ADMIN — Medication 15 MG: at 09:21

## 2021-12-15 RX ADMIN — SUGAMMADEX 200 MG: 100 INJECTION, SOLUTION INTRAVENOUS at 10:42

## 2021-12-15 RX ADMIN — KETAMINE HYDROCHLORIDE 25 MG: 100 INJECTION INTRAMUSCULAR; INTRAVENOUS at 09:09

## 2021-12-15 RX ADMIN — HYDROMORPHONE HYDROCHLORIDE 1 MG: 2 INJECTION, SOLUTION INTRAMUSCULAR; INTRAVENOUS; SUBCUTANEOUS at 10:27

## 2021-12-15 RX ADMIN — MIDAZOLAM 0.5 MG: 1 INJECTION INTRAMUSCULAR; INTRAVENOUS at 11:42

## 2021-12-15 RX ADMIN — MIDAZOLAM 0.5 MG: 1 INJECTION INTRAMUSCULAR; INTRAVENOUS at 11:37

## 2021-12-15 NOTE — ANESTHESIA PREPROCEDURE EVALUATION
Relevant Problems   No relevant active problems       Anesthetic History   No history of anesthetic complications            Review of Systems / Medical History  Patient summary reviewed, nursing notes reviewed and pertinent labs reviewed    Pulmonary  Within defined limits      Sleep apnea           Neuro/Psych   Within defined limits           Cardiovascular  Within defined limits  Hypertension              Exercise tolerance: >4 METS  Comments: Normal coronary arteries.  Left dominant system  · Normal LV end diasstolic filling pressure:    GI/Hepatic/Renal  Within defined limits   GERD           Endo/Other  Within defined limits      Morbid obesity     Other Findings              Physical Exam    Airway  Mallampati: II  TM Distance: > 6 cm  Neck ROM: normal range of motion   Mouth opening: Normal     Cardiovascular  Regular rate and rhythm,  S1 and S2 normal,  no murmur, click, rub, or gallop             Dental  No notable dental hx       Pulmonary  Breath sounds clear to auscultation               Abdominal  GI exam deferred       Other Findings            Anesthetic Plan    ASA: 2  Anesthesia type: general          Induction: Intravenous  Anesthetic plan and risks discussed with: Patient

## 2021-12-15 NOTE — PERIOP NOTES
TRANSFER - OUT REPORT:    Verbal report given to Livier(name) on Jack Barker  being transferred to 549(unit) for routine post - op       Report consisted of patients Situation, Background, Assessment and   Recommendations(SBAR). Time Pre op antibiotic given:0900  Anesthesia Stop time: 4521  Suárez Present on Transfer to floor:no  Order for Suárez on Chart:no  Discharge Prescriptions with Chart:no    Information from the following report(s) Procedure Summary and Accordion was reviewed with the receiving nurse. Opportunity for questions and clarification was provided. Is the patient on 02? YES       L/Min 2      Is the patient on a monitor? NO    Is the nurse transporting with the patient? NO    Surgical Waiting Area notified of patient's transfer from PACU? yes    silas  The following personal items collected during your admission accompanied patient upon transfer:   Dental Appliance: Dental Appliances: None  Vision:    Hearing Aid:    Jewelry:    Clothing: Clothing: Other (comment) (clothing)  Other Valuables:  Other Valuables: Eyeglasses  Valuables sent to safe:      Glasses and clotghes returned to pt in pacu

## 2021-12-15 NOTE — PROGRESS NOTES
CM attempted to speak with patient via room phone, phone continues to ring. Call placed to patient's mobile number at [phone] and a vm was left.     Ning Harris, CRM

## 2021-12-15 NOTE — BRIEF OP NOTE
Brief Postoperative Note    Patient: Aislinn De La Cruz  YOB: 1970  MRN: [id number]    Date of Procedure: 12/15/2021     Pre-Op Diagnosis: STATUS POST LUMBAR FUSION/SPONDYLOLISTHESIS/STENOSIS    Post-Op Diagnosis: Same as preoperative diagnosis. Procedure(s):  REVISION LAMINECTOMY L3-4, L4-5 WITH REVISION FUSION L3-L5     Surgeon(s):  Christianne Min MD    Surgical Assistant: Physician Assistant: SUBHA Calderon    Anesthesia: General     Estimated Blood Loss (mL): 721KJ    Complications: None    Specimens: * No specimens in log *     Implants:   Implant Name Type Inv. Item Serial No.  Lot No. LRB No. Used Action   GRAFT BNE SUB L CANC FRZN MORSELIZED W/ VIABLE CELL YOAN - [id number]  GRAFT BNE SUB L CANC FRZN MORSELIZED W/ VIABLE CELL YOAN [id number] [employer]_ N/A N/A 1 Implanted   GRAFT BNE SUB L CANC FRZN MORSELIZED W/ VIABLE CELL YOAN - [id number]  GRAFT BNE SUB L CANC FRZN MORSELIZED W/ VIABLE CELL YOAN [id number] [employer]_ N/A N/A 1 Implanted   catalyft pl expandable interbody system   N/A [employer] [id number] N/A 1 Implanted   catalyft pl expandable interbody system   N/A [employer] [id number] N/A 1 Implanted   signafuse   N/A conne[employer]io [facility] [id number] N/A 1 Implanted   SCREW SPNL L45MM DIA6. 5MM POST THORACOLUMBOSACRAL CO CHROM - SN/A  SCREW SPNL L45MM DIA6. 5MM POST THORACOLUMBOSACRAL CO CHROM N/A [employer] SOFAMOR DANEK_WD N/A N/A 2 Implanted   SCREW SPNL L40MM DIA6. 5MM POST THORACOLUMBOSACRAL CO CHROM - SN/A  SCREW SPNL L40MM DIA6. 5MM POST THORACOLUMBOSACRAL CO CHROM N/A [employer] SOFAMOR DANEK_WD N/A N/A 4 Implanted   SET SCR SPNL L6MM DIA5. 5MM TI BRK OFF ANT W/ DETACH [address] - SN/A  SET SCR SPNL L6MM DIA5. 5MM TI BRK OFF ANT W/ DETACH [address] N/A [employer] SOFGUTIERREZOR DEYANIRAEK_WD N/A N/A 6 Implanted   MICHELLE SPNL L70MM DIA5. 5MM THOR LUMBER TI CRV [address] SOLERA - SN/A  MICHELLE SPNL L70MM [facility]A N/A [employer] SOFGUTIERREZOR MICHAEL_WD N/A N/A 2 Implanted       Drains:   Hemovac Right; Lower Back (Active)       Findings: stenosis    Electronically Signed by SUBHA Kraus on 12/15/2021 at 10:39 AM

## 2021-12-15 NOTE — PROGRESS NOTES
Physical Therapy 12/15/2021    Orders received and chart reviewed up to date. Pt POD 0 revision laminectomy L3-L5. Will follow-up tomorrow for PT evaluation as appropriate. Recommend with nursing patient to complete as able in order to maintain strength, endurance and independence: OOB to chair 3x/day. Thank you.   Kellie Hicks, PT, DPT

## 2021-12-15 NOTE — ANESTHESIA POSTPROCEDURE EVALUATION
Procedure(s):  REVISION LAMINECTOMY L3-4, L4-5 WITH REVISION FUSION L3-L5 . general    Anesthesia Post Evaluation        Patient location during evaluation: PACU  Note status: Adequate. Level of consciousness: responsive to verbal stimuli and sleepy but conscious  Pain management: satisfactory to patient  Airway patency: patent  Anesthetic complications: no  Cardiovascular status: acceptable  Respiratory status: acceptable  Hydration status: acceptable  Comments: +Post-Anesthesia Evaluation and Assessment    Patient: Leena Heaton MRN: [id number]  SSN: [id number]   YOB: 1970  Age: 44 y.o. Sex: female          Cardiovascular Function/Vital Signs    BP (!) 113/51   Pulse 72   Temp 36.5 °C (97.7 °F)   Resp 8   Ht 5' 5\" (1.651 m)   Wt 119.8 kg (264 lb 1.8 oz)   SpO2 100%   BMI 43.95 kg/m²     Patient is status post Procedure(s):  REVISION LAMINECTOMY L3-4, L4-5 WITH REVISION FUSION L3-L5 . Nausea/Vomiting: Controlled. Postoperative hydration reviewed and adequate. Pain:  Pain Scale 1: Numeric (0 - 10) (12/15/21 3866)  Pain Intensity 1: 8 (12/15/21 9605)   Managed. Neurological Status:   Neuro (WDL): Within Defined Limits (12/15/21 0636)   At baseline. Mental Status and Level of Consciousness: Arousable. Pulmonary Status:   O2 Device: CO2 nasal cannula (12/15/21 1101)   Adequate oxygenation and airway patent. Complications related to anesthesia: None    Post-anesthesia assessment completed. No concerns. I have evaluated the patient and the patient is stable and ready to be discharged from PACU . Signed By: Vivian Archer MD    12/15/2021        INITIAL Post-op Vital signs:   Vitals Value Taken Time   /62 12/15/21 1145   Temp 36.5 °C (97.7 °F) 12/15/21 1101   Pulse 74 12/15/21 1149   Resp 23 12/15/21 1149   SpO2 100 % 12/15/21 1149   Vitals shown include unvalidated device data.

## 2021-12-16 LAB
ANION GAP SERPL CALC-SCNC: 7 MMOL/L (ref 5–15)
BUN SERPL-MCNC: 24 MG/DL (ref 6–20)
BUN/CREAT SERPL: 23 (ref 12–20)
CALCIUM SERPL-MCNC: 8.3 MG/DL (ref 8.5–10.1)
CHLORIDE SERPL-SCNC: 104 MMOL/L (ref 97–108)
CO2 SERPL-SCNC: 25 MMOL/L (ref 21–32)
CREAT SERPL-MCNC: 1.03 MG/DL (ref 0.55–1.02)
GLUCOSE SERPL-MCNC: 110 MG/DL (ref 65–100)
HGB BLD-MCNC: 10.8 G/DL (ref 11.5–16)
POTASSIUM SERPL-SCNC: 4.3 MMOL/L (ref 3.5–5.1)
SODIUM SERPL-SCNC: 136 MMOL/L (ref 136–145)

## 2021-12-16 PROCEDURE — 65270000029 HC RM PRIVATE

## 2021-12-16 PROCEDURE — 74011250636 HC RX REV CODE- 250/636: Performed by: PHYSICIAN ASSISTANT

## 2021-12-16 PROCEDURE — 97535 SELF CARE MNGMENT TRAINING: CPT

## 2021-12-16 PROCEDURE — 74011250637 HC RX REV CODE- 250/637: Performed by: PHYSICIAN ASSISTANT

## 2021-12-16 PROCEDURE — 85018 HEMOGLOBIN: CPT

## 2021-12-16 PROCEDURE — 80048 BASIC METABOLIC PNL TOTAL CA: CPT

## 2021-12-16 PROCEDURE — 97116 GAIT TRAINING THERAPY: CPT

## 2021-12-16 PROCEDURE — 36415 COLL VENOUS BLD VENIPUNCTURE: CPT

## 2021-12-16 PROCEDURE — 77030041034 HC KT HIP PATT -B

## 2021-12-16 PROCEDURE — 74011000250 HC RX REV CODE- 250: Performed by: PHYSICIAN ASSISTANT

## 2021-12-16 PROCEDURE — 97530 THERAPEUTIC ACTIVITIES: CPT

## 2021-12-16 PROCEDURE — 97165 OT EVAL LOW COMPLEX 30 MIN: CPT

## 2021-12-16 PROCEDURE — 97161 PT EVAL LOW COMPLEX 20 MIN: CPT

## 2021-12-16 RX ADMIN — KETOROLAC TROMETHAMINE 30 MG: 30 INJECTION, SOLUTION INTRAMUSCULAR; INTRAVENOUS at 06:26

## 2021-12-16 RX ADMIN — DOCUSATE SODIUM 50 MG AND SENNOSIDES 8.6 MG 1 TABLET: 8.6; 5 TABLET, FILM COATED ORAL at 09:39

## 2021-12-16 RX ADMIN — CEFAZOLIN SODIUM 2 G: 1 INJECTION, POWDER, FOR SOLUTION INTRAMUSCULAR; INTRAVENOUS at 00:51

## 2021-12-16 RX ADMIN — GABAPENTIN 300 MG: 300 CAPSULE ORAL at 21:34

## 2021-12-16 RX ADMIN — KETOROLAC TROMETHAMINE 30 MG: 30 INJECTION, SOLUTION INTRAMUSCULAR; INTRAVENOUS at 00:58

## 2021-12-16 RX ADMIN — GABAPENTIN 300 MG: 300 CAPSULE ORAL at 16:14

## 2021-12-16 RX ADMIN — ACETAMINOPHEN 1000 MG: 500 TABLET ORAL at 12:06

## 2021-12-16 RX ADMIN — ACETAMINOPHEN 1000 MG: 500 TABLET ORAL at 18:04

## 2021-12-16 RX ADMIN — OXYCODONE 5 MG: 5 TABLET ORAL at 16:14

## 2021-12-16 RX ADMIN — OXYCODONE 10 MG: 5 TABLET ORAL at 21:28

## 2021-12-16 RX ADMIN — OXYCODONE 10 MG: 5 TABLET ORAL at 09:39

## 2021-12-16 RX ADMIN — Medication 10 ML: at 22:00

## 2021-12-16 RX ADMIN — KETOROLAC TROMETHAMINE 30 MG: 30 INJECTION, SOLUTION INTRAMUSCULAR; INTRAVENOUS at 12:06

## 2021-12-16 RX ADMIN — OXYCODONE 10 MG: 5 TABLET ORAL at 13:14

## 2021-12-16 RX ADMIN — HYDROCHLOROTHIAZIDE 12.5 MG: 25 TABLET ORAL at 10:15

## 2021-12-16 RX ADMIN — LOSARTAN POTASSIUM 100 MG: 50 TABLET, FILM COATED ORAL at 09:39

## 2021-12-16 RX ADMIN — GABAPENTIN 300 MG: 300 CAPSULE ORAL at 09:39

## 2021-12-16 RX ADMIN — PANTOPRAZOLE SODIUM 40 MG: 40 TABLET, DELAYED RELEASE ORAL at 06:26

## 2021-12-16 RX ADMIN — DOCUSATE SODIUM 50 MG AND SENNOSIDES 8.6 MG 1 TABLET: 8.6; 5 TABLET, FILM COATED ORAL at 18:04

## 2021-12-16 RX ADMIN — ACETAMINOPHEN 1000 MG: 500 TABLET ORAL at 06:26

## 2021-12-16 RX ADMIN — DULOXETINE HYDROCHLORIDE 90 MG: 60 CAPSULE, DELAYED RELEASE ORAL at 09:39

## 2021-12-16 RX ADMIN — Medication 10 ML: at 14:00

## 2021-12-16 RX ADMIN — OXYCODONE 10 MG: 5 TABLET ORAL at 06:40

## 2021-12-16 RX ADMIN — ACETAMINOPHEN 1000 MG: 500 TABLET ORAL at 00:54

## 2021-12-16 RX ADMIN — AMLODIPINE BESYLATE 10 MG: 5 TABLET ORAL at 10:14

## 2021-12-16 NOTE — ADT AUTH CERT NOTES
[address]     FACILITY NPI :[id number]  FACILITY TAX ID :      [facility]  [address] [id number] [phone]          DEPT CONTACT:  Matthew Coffey  RP#[phone]  XNN#[phone]     Patient Name :Chucho Hardin   : 1970 (44 yrs)  MRN : [id number]     Patient Mailing Address [address] [[address]] , [address]                Insurance Plan Payor: BLUE CROSS / [place] / Product Type: PPO /      Primary Coverage Subscriber ID : [id number]     Secondary Coverage:  N/A         Current Patient Class : INPATIENT  Admit Date : 12/15/2021     REQUESTED LEVEL OF CARE: INPATIENT [101]                                                           Diagnosis : Lumbar stenosis with neurogenic claudication                          ICD10 Code : Lumbar stenosis with neurogenic claudication [M48.062]     Current Room and Bed 549/01     Admitting and Attending Info:  Admitting Provider : Dondra Schirmer, MD   NPI: [id number]  Admitting Provider Phone.  [phone]  Admitting Provider Address: [facility] [address]     Attending Provider Therman Skiff, MD   [id number]  Attending Provider Address:  [address] [place] [address]     Attending Provider Phone: Rhea alatorre phone: [phone]            Utilization Reviews         Lumbar Fusion - Care Day 2 (2021) by Nadiya Gross RN       Review Entered Review Status   2021 15:02 Completed      Criteria Review      Care Day: 2 Care Date: 2021 Level of Care: Inpatient Floor    Guideline Day 1    Level Of Care    (X) OR to floor    2021 15:02:07 EST by Wyatt moeller    Clinical Status    ( ) * Clinical Indications met    12/16/2021 15:02:07 EST by Khoi Nelson      97.9 98 94/59 18 96%RA    Activity    (X) Possibly assisted, limited ambulation postoperatively    12/16/2021 15:02:07 EST by Khoi Nelson      ambulate with assist    Routes    ( ) IV medications    12/16/2021 15:02:07 EST by Khoi Nelson      tylenol 1000mg poq6 norvasc 10mg podaily cymbalta 90mg podaily neurontin 300mg ef5swdexm losartan 100mg podaily hydrodiuril 12.5mg podaily roxicodone 10mg poq3 prnx3    Interventions    (X) DVT prophylaxis    12/16/2021 15:02:07 EST by Khoi Nelson      scds    Medications    (X) Prophylactic antibiotics    12/16/2021 15:02:07 EST by Khoi Nelson      ancef 2g ivq8    (X) Possible PCA    12/16/2021 15:02:07 EST by Khoi Nelson      toradol 30mg ivq6 morphine pca    * Milestone   Additional Notes   12/16 LOC IP Ortho      Labs   HGB: 10.8 (L)   Sodium: 136   Potassium: 4.3   Chloride: 104   CO2: 25   Anion gap: 7   Glucose: 110 (H)   BUN: 24 (H)   Creatinine: 1.03 (H)   BUN/Creatinine ratio: 23 (H)   Calcium: 8.3 (L)   GFR est non-AA: 56 (L)   GFR est AA: >60      Ortho   Assessment:   Patient is 1 Day Post-Op s/p Procedure(s):   REVISION LAMINECTOMY L3-4, L4-5 WITH REVISION FUSION L3-L5    Plan:   1.  Continue PT/OT   2.  Continue established methods of pain control   3.  VTE Prophylaxes - TEDS &/or SCDs    4.  Advance diet   5.  Drain output 100 cc overnight; DC drain when < 80 cc per 8 hour shift       PT   ASSESSMENT   Based on the objective data described below, the patient presents with decreased independence with functional mobility post-op day 1 revision L3-4, L4-5 laminectomy and fusion. She is able to verbalize 3/3 back precautions as well as sitting limitations. She transitions supine to sit from a flat bed with use of bed rail and CGA. Patient demonstrates good sitting balance EOB. She is able to perform sit to stand with CGA. Brace is donned with assistance for positioning. Patient is able ambulate about 40 ft before reporting bilateral LE weakness. She is returned to sitting EOB. BP is elevated. Patient is left in bedside chair with all needs met. OT   ASSESSMENT   Based on the objective data described below, the patient presents with back precautions, decrease mobility, and decrease distal reach with LB self-care.  Pt very pleasant and motivated to participate with OT.  Supportive son present in the room.  Reviewed and practiced use of AE for LB self-care.  Demonstrate and verbalized understanding.  Pt wanted a hip kit and provided one. Also reviewed toilet aide to assist with hygiene after toileting.  Pt plans to purchase.  Pt stood at supervision to re-set after sitting for >30 min.  Feel pt would benefit from one additional OT session.  No skilled OT recommended for discharge.                       Lumbar Fusion - Care Day 1 (12/15/2021) by Nadiya Gross RN       Review Entered Review Status   12/16/2021 11:50 Completed      Criteria Review      Care Day: 1 Care Date: 12/15/2021 Level of Care:    Guideline Day 1    Level Of Care    (X) OR to floor    12/16/2021 11:50:14 EST by Nadiya Gross      ortho    Clinical Status    ( ) * Clinical Indications met    12/16/2021 11:50:14 EST by Wyatt Valdes 5 years out from a lumbar laminectomy and fusion L4-5, now with junctional stenosis at L3-4 with breakdown and spondylolisthesis, failed conservative treatment    97.9 83 73/53 16 94%NC3L 119.8kg    Activity    (X) Possibly assisted, limited ambulation postoperatively    12/16/2021 11:50:14 EST by Nadiya Gross      ambulate with assist    Routes    (X) IV fluids    12/16/2021 11:50:14 EST by [email or web address]    ( ) IV medications    12/16/2021 11:50:14 EST by Nadiya Gross      gabapentin 300mg jj3lvenme versed 0.5mg nqu3wdj prnx4    Interventions    (X) DVT prophylaxis    12/16/2021 11:50:14 EST by Nadiya Gross      SCDs    Medications    (X) Prophylactic antibiotics    12/16/2021 11:50:14 EST by Cristi Ni      ancef 2g ivq8 ancef 2g ivx1    (X) Possible PCA    12/16/2021 11:50:14 EST by Cristi Smith      toradol 30mg ivq6 morphine PCA    * Milestone   Additional Notes   12/15 LOC IP Ortho      OP Note   PREOPERATIVE DIAGNOSES:  Status post L4-5 fusion, lumbar stenosis L2-3, L3-4 with lumbar retrolisthesis L3-5. POSTOPERATIVE DIAGNOSES:  Status post L4-5 fusion, lumbar stenosis L2-3, L3-4 with lumbar retrolisthesis L3-5. PROCEDURES PERFORMED:  Exploration of fusion with a revision laminectomy L2-3, L3-4 with facetectomy.  Christie osteotomy L3-4 bilaterally.  Revision fusion L3 to L5 with segmental instrumentation. ANESTHESIA:  General.   COMPLICATIONS:  None   SPECIMENS REMOVED:  None   IMPLANTS:  Medtronic Solera and Medtronic Elevate   ESTIMATED BLOOD LOSS:  None   INDICATIONS:  This is a pleasant 57-year-old female who is approximately 5 years out from a lumbar laminectomy and fusion L4-5, now with junctional stenosis at L3-4 with breakdown and spondylolisthesis.  The patient had failed conservative treatment, understood the risks and benefits, elected to proceed. History of Present Illness   The patient is a 44year old female who presents with neck pain. Additional reasons for visit:   Low Back Pain is described as the following:   Note for \"Low Back Pain\": She presents today mainly for lower back pain. She has both neck and lower back pain but her lower back is been the most significant of the last year. She has been failing conservative treatment with worsening pain with activities. The activities significantly worsen the back pain and the bilateral leg symptoms. She denies any bowel bladder difficulties. She does get some relief with sitting. She has a known history of a prior L4-5 fusion and junctional stenosis at L3-4. Impression: She overall is struggling with the increased pain above her previous fusion.  She has failed conservative treatment including injection therapy physical therapy over the last year. We do lengthy discussion about treatment options. I think she is a candidate for a revision laminectomy at L3-4 and L4-5 with a posterior lumbar fusion from L3-L5. Risk and benefits were discussed with her.               Lumbar Fusion - Clinical Indications for Procedure by Kevin Gavin RN       Review Entered Review Status   2021 11:40 Completed      Criteria Review      Clinical Indications for Procedure    Most Recent : Kevin Gavin Most Recent Date: 2021 11:40:51 EST    ( ) Procedure is indicated for  1 or more  of the following  (1) (2) (3) (4) (5) (6) (7):       ( ) Spinal stenosis surgery requiring stabilization with fusion, as indicated by  ALL  of the       following  (18) (19) (20):          ( ) Unacceptable postoperative instability is judged to be likely due to extent of disease or          surgery (eg, multiple vertebral levels involved).       2021 11:40:51 EST by Kevin Gavin            5 years out from a lumbar laminectomy and fusion L4-5, now with junctional stenosis at L3-4 with breakdown and spondylolisthesis, failed conservative treatment       H&P Notes       H&P by Holland Jolly MD at 21 1441 documented on Admission (Current) from 12/15/2021 in [address] [place] [address]    Author: Holland Jolly MD Author Type: Physician Filed: 12/15/21 0718   Note Status: Addendum Cosign: Cosign Not Required Date of Service: 21 2960   : Holland Jolly MD (Physician)       Prior Versions: 1.  Claudia Negrete (Physician Assistant) at 21 7962 - 292 [facility]  Location: [facility]  Patient #: [id number]  : 1970  Single / Language: [place] / Race: White  Female        History of Present Illness  The patient is a 44year old female who presents with neck pain.     Additional reasons for visit:     Low Back Pain is described as the following:  Note for \"Low Back Pain\": She presents today mainly for lower back pain. She has both neck and lower back pain but her lower back is been the most significant of the last year. She has been failing conservative treatment with worsening pain with activities. The activities significantly worsen the back pain and the bilateral leg symptoms. She denies any bowel bladder difficulties. She does get some relief with sitting. She has a known history of a prior L4-5 fusion and junctional stenosis at L3-4.        Problem List/Past Medical   STENOSIS (724.02)    REVIEW OF SYSTEMS: Systems were reviewed by the provider.    DIETARY COUNSELING (V65.3)    Sciatica, right side (M54.31)    SPONDYLOLISTHESIS, ACQ. 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    Weight above 97th percentile (V49.89  Z78.9)    Cervical spinal stenosis (723.0  M48.02)    Cervical disc herniation (722.0  M50.20)    Lumbar stenosis with neurogenic claudication (724.03  M48.062)    Lumbar radiculopathy (724.4  M54.16)    Lumbar foraminal stenosis (724.02  M48.061)    DDD (degenerative disc disease), cervical (722.4  M50.30)    S/P lumbar laminectomy (V45.89  Z98.890)    Aftercare following surgery for injury or trauma (V58.43  Z48.89)    Neck pain, bilateral (723.1  M54.2)    Low back pain with radiation (724.2  M54.50)       Allergies   Augmentin *PENICILLINS*   [11/01/2021 09:40 AM]:     Family History   Hypertension   Father, Mother.     Social History   Alcohol use   03/17/2015: Drinks beer and wine 3 times weekly, having 3-5 drinks per occasion. Rarely drinks more than 5 drinks per occasion. Tobacco use   Current some day smoker. Sun Exposure   occasionally  Caffeine use   2015: Drinks coffee, 3-4 drinks per day. Current work status   working full time  Tobacco / smoke exposure   yes outdoors only  Illicit drug use   none  Seat Belt Use   always  Exercise   2015: Walks 3-4 times per week. Marital status        Medication History   Gabapentin  (300MG Capsule, 2 (two) Oral at bedtime and 1 in the morning, Taken starting 2021) Active. Cymbalta  (30MG Capsule DR Part, 90MG DAILY Oral DAILY) Active. amLODIPine Besylate  (10MG Tablet, Oral) Active. Irbesartan-hydroCHLOROthiazide  (300-12.5MG Tablet, Oral) Active. Medications Reconciled      Pregnancy / Birth History   Pregnant   no     Past Surgical History   Breast Biopsy   right   Delivery   2 times  Dilation and Curettage of Uterus - Multiple    Gallbladder Surgery   laparoscopic  Hysterectomy   non-cancerous: partial and vaginal     Diagnostic Studies History   Cervical Spine X-ray   Date: 2021, Results: Review of the cervical x-rays demonstrate multilevel cervical spondylosis. No subluxations fractures or lytic lesions. Anterior osteophyte formation is noted. Lumbar Spine X-ray   Date: 2021, Results: Review of the lumbar x-rays today demonstrate a stable L4-5 fusion. She has a retrolisthesis at L3-4 approximately 4 mm. It reduces completely with flexion. She has no fracture lytic lesions.     Other Problems   Depression    High blood pressure    Allergic Urticaria    Treatment options were discussed with the patient in full.             Physical Exam   Neurologic  Sensory  Light Touch - Intact - Globally. Overall Assessment of Muscle Strength and Tone reveals  Upper Extremities - Right Deltoid - 5/5. Left Deltoid - 5/5. Right Bicep - 5/5. Left Bicep - 5/5. Right Tricep - 5/5. Left Tricep - 5/5. Right Wrist Extensors - 5/5. Left Wrist Extensors - 5/5. Right Wrist Flexors - 5/5. Left Wrist Flexors - 5/5. Right Intrinsics - 5/5. Left Intrinsics - 5/5. Lower Extremities - Right Iliopsoas - 5/5. Left Iliopsoas - 5/5. Right Tibialis Anterior - 4-/5. Left Tibialis Anterior - 4-/5. Right Gastroc-Soleus - 5/5. Left Gastroc-Soleus - 5/5. Right EHL - 4-/5. Left EHL - 4/5. General Assessment of Reflexes  Right Hand - Rogel's sign is negative in the right hand. Left Hand - Rogel's sign is negative in the left hand. Right Ankle - Clonus is not present. Left Ankle - Clonus is not present. Reflexes (Dermatomes)  2/2 Normal - Left Achilles (L5-S2), Left Bicep (C5-6), Left Knee (L2-4), Left Tricep (C7-8), Left Brachioradialis (C5-6), Right Achilles (L5-S2), Right Bicep (C5-6), Right Knee (L2-4), Right Tricep (C7-8) and Right Brachioradialis (C5-6).    Musculoskeletal  Global Assessment  Examination of related systems reveals - well-developed, well-nourished, in no acute distress, alert and oriented x 3 and normal coordination. Gait and Station - normal gait and station and normal posture. Right Lower Extremity - normal range of motion without pain and no instability, subluxation or laxity. Left Lower Extremity - normal range of motion without pain and no instability, subluxation or laxity. Spine/Ribs/Pelvis  Cervical Spine - Evaluation of related systems reveals - no lymphadenopathy and neurovascularly intact bilaterally. Examination of the cervical spine reveals - no swelling, edema or erythema and normal sensation. Inspection and Palpation - Tenderness - moderate and localized. Assessment of pain reveals the following findings: - Location - cervical area. ROJM - Flexion - 85 °. Right Lateral Flexion - 35 °. Left Lateral Flexion - 35 °. Extension - 70 °. Right Rotation - 80 °. Left Rotation - 80 °. Cervical Spine - Functional Testing - Foraminal Compression/Spurling's Test negative, Shoulder Depression Test negative, Upper Limb Tension Test negative.  Thoracic (Dorsal) Spine - Examination of the thoracic spine reveals - no tenderness over thoracic vertebrae, no pain, normal sensation and normal thoracic spine movements. Lumbosacral Spine - Examination of the lumbosacral spine reveals - no tenderness to palpation, no pain, normal strength and tone, no laxity or crepitus, normal lumbosacral spine movements and no known fractures or deformities. Functional Testing - Babinski Test negative, Prone Knee Bending Test negative, Slump Test negative, Straight Leg Raising Test negative.           Assessment & Plan      REVIEW OF SYSTEMS: Systems were reviewed by the provider.(V49.9)     Current Plans  Pt Education - How to Access Health Information Online using Patient Portal and 3rd Party Apps: discussed with patient and provided information. X-RAY EXAM OF CERVICAL SPINE MIN 4 VIEWS (02157) (AP, Lateral and Flexion and Extension views were taken today using Digital Radiography.)  X-RAY EXAM OF LUMBAR SPINE, 4 OR MORE VIEWS (43257) (AP, Lateral, Flexion and Extension views were taken today using Digital Radiography.)  Continued Gabapentin 300 MG Oral Capsule, 2 (two) Capsule at bedtime and 1 in the morning, #90, 11/01/2021, Ref. x1.     Lumbar stenosis with neurogenic claudication (724.03  M48.062)        S/P lumbar fusion (V45.4  Z98.1)  Impression: She overall is struggling with the increased pain above her previous fusion. She has failed conservative treatment including injection therapy physical therapy over the last year. We do lengthy discussion about treatment options. I think she is a candidate for a revision laminectomy at L3-4 and L4-5 with a posterior lumbar fusion from L3-L5. Risk and benefits were discussed with her.     The risks and benefits were discussed at length with the patient and the patient has elected to proceed. Indications for surgery include failed conservative treatment. Alternative treatments, risks and the perioperative course were discussed with the patient. All questions were answered. The risks and benefits of the procedure were explained. Benefits include definitive diagnosis, relief of pain, elimination of deformity and improved function. Risks of surgery including bleeding, infection, weakness, numbness, CSF leak, failure to improve symptoms, exacerbation of medical co-morbidities and even death were discussed with the patient.        Spondylolisthesis (Acquired) (738.4  M43.10)        Treatment options were discussed with the patient in full.(V65.49)     Current Plans  Presurgical planning was preformed with the patient today  Surgery to be scheduled  Procedure: Revision laminectomy L3-4 L4-5 with revision fusion L3-L5     Date of Surgery Update:  Virginia Hidalgo was seen and examined. History and physical has been reviewed. The patient has been examined.  There have been no significant clinical changes since the completion of the originally dated History and Physical.     Signed By: Luis Suresh MD      December 15, 2021 7:18 AM                   Signed by Luis Suresh MD

## 2021-12-16 NOTE — PROGRESS NOTES
Spine Surgery Progress Note    Admit Date: 12/15/2021   LOS: 1 day      Daily Progress Note: 12/16/2021    POD:1 Day Post-Op    S/P: Procedure(s):  REVISION LAMINECTOMY L3-4, L4-5 WITH REVISION FUSION L3-L5     Visit Vitals  BP (!) 94/59 (BP 1 Location: Right upper arm, BP Patient Position: At rest;Lying)   Pulse 71   Temp 97.9 °F (36.6 °C)   Resp 18   Ht 5' 5\" (1.651 m)   Wt 119.8 kg (264 lb 1.8 oz)   SpO2 96%   BMI 43.95 kg/m²      Lab Results   Component Value Date/Time    HGB 10.8 (L) 12/16/2021 02:35 AM    INR 1.0 12/10/2021 03:44 PM       Voiding status: voiding without issue  Has had BM    Calves soft/NTTP Bilaterally. Moving LE well. Neurocirculatory exam WNL. Motor and sensation intact. Drain output: 90mL today. D/C HVAC drain when output <80mL over 8 hours. Incision OK; no drainage. Dressing clean and dry. Patient doing well overall. No nausea or vomiting. Pain well controlled on current medications. No complaints. Progressing with PT/OT  Ambulating without issue. Denies nausea, vomiting, fever, chills, chest pain, dyspnea, headache. Discharge pending; likely tomorrow AM pending PT clearance and drain output. All questions were answered. Follow up in Dr. Anne-Marie He office in 2 weeks, sooner if needed.     SUBHA Chiu

## 2021-12-16 NOTE — PROGRESS NOTES
Problem: Falls - Risk of  Goal: *Absence of Falls  Description: Document Earma Blanca Fall Risk and appropriate interventions in the flowsheet.   Outcome: Progressing Towards Goal  Note: Fall Risk Interventions:  Mobility Interventions: Patient to call before getting OOB,Bed/chair exit alarm         Medication Interventions: Patient to call before getting OOB,Bed/chair exit alarm                   Problem: Patient Education: Go to Patient Education Activity  Goal: Patient/Family Education  Outcome: Progressing Towards Goal

## 2021-12-16 NOTE — ADT AUTH CERT NOTES
[address]     FACILITY NPI :[id number]  FACILITY TAX ID :      [facility]  [address] [id number] [phone]          DEPT CONTACT:  Chris Nicholas  #[phone]  [facility]#[phone]     Patient Name :Kylah Romano   : 1970 (44 yrs)  MRN : [id number]     Patient Mailing Address [address] [[address]] , [address]                Insurance Plan Payor: BLUE CROSS / [place] / Product Type: PPO /      Primary Coverage Subscriber ID : [id number]     Secondary Coverage:  N/A         Current Patient Class : INPATIENT  Admit Date : 12/15/2021     REQUESTED LEVEL OF CARE: INPATIENT [101]                                                           Diagnosis : Lumbar stenosis with neurogenic claudication                          ICD10 Code : Lumbar stenosis with neurogenic claudication [M48.062]     Current Room and Bed 549/01     Admitting and Attending Info:  Admitting Provider : Tracee Gongora MD   NPI: [id number]  Admitting Provider Phone. [phone]  Admitting Provider Address: [facility] [address]     Attending Provider Lizett Patel MD   [id number]  Attending Provider Address:  [address] [place] [address]     Attending Provider Phone: Attending phys phone: [phone]            H&P Notes       H&P by Lizett Patel MD at 21 1443 documented on Admission (Current) from 12/15/2021 in [address] [place] [address]    Author: Lizett Patel MD Author Type: Physician Filed: 12/15/21 0718   Note Status: Addendum Cosign: Cosign Not Required Date of Service: 21   : Lizett Patel MD (Physician)       Prior Versions: 1.  Rony SUBHA Trevizo (Physician Assistant) at 21 3011 - [facility]  Location: [facility]  Patient #: [id number]  : 1970  Single / Language: [place] / Race: White  Female        History of Present Illness  The patient is a 44year old female who presents with neck pain.     Additional reasons for visit:     Low Back Pain is described as the following:  Note for \"Low Back Pain\": She presents today mainly for lower back pain. She has both neck and lower back pain but her lower back is been the most significant of the last year. She has been failing conservative treatment with worsening pain with activities. The activities significantly worsen the back pain and the bilateral leg symptoms. She denies any bowel bladder difficulties. She does get some relief with sitting.  She has a known history of a prior L4-5 fusion and junctional stenosis at L3-4.        Problem List/Past Medical   STENOSIS (724.02)    REVIEW OF SYSTEMS: Systems were reviewed by the provider.    DIETARY COUNSELING (V65.3)    Sciatica, right side (M54.31)    SPONDYLOLISTHESIS, ACQ. 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    Weight above 97th percentile (V49.89  Z78.9)    Cervical spinal stenosis (723.0  M48.02)    Cervical disc herniation (722.0  M50.20)    Lumbar stenosis with neurogenic claudication (724.03  M48.062)    Lumbar radiculopathy (724.4  M54.16)    Lumbar foraminal stenosis (724.02  M48.061)    DDD (degenerative disc disease), cervical (722.4  M50.30)    S/P lumbar laminectomy (V45.89  Z98.890)    Aftercare following surgery for injury or trauma (V58.43  Z48.89)    Neck pain, bilateral (723.1  M54.2)    Low back pain with radiation (724.2  M54.50)       Allergies   Augmentin *PENICILLINS*   [2021 09:40 AM]:     Family History   Hypertension   Father, Mother.     Social History   Alcohol use   2015: Drinks beer and wine 3 times weekly, having 3-5 drinks per occasion. Rarely drinks more than 5 drinks per occasion. Tobacco use   Current some day smoker. Sun Exposure   occasionally  Caffeine use   2015: Drinks coffee, 3-4 drinks per day. Current work status   working full time  Tobacco / smoke exposure   yes outdoors only  Illicit drug use   none  Seat Belt Use   always  Exercise   2015: Walks 3-4 times per week. Marital status        Medication History   Gabapentin  (300MG Capsule, 2 (two) Oral at bedtime and 1 in the morning, Taken starting 2021) Active. Cymbalta  (30MG Capsule DR Part, 90MG DAILY Oral DAILY) Active. amLODIPine Besylate  (10MG Tablet, Oral) Active. Irbesartan-hydroCHLOROthiazide  (300-12.5MG Tablet, Oral) Active. Medications Reconciled      Pregnancy / Birth History   Pregnant   no     Past Surgical History   Breast Biopsy   right   Delivery   2 times  Dilation and Curettage of Uterus - Multiple    Gallbladder Surgery   laparoscopic  Hysterectomy   non-cancerous: partial and vaginal     Diagnostic Studies History   Cervical Spine X-ray   Date: 2021, Results: Review of the cervical x-rays demonstrate multilevel cervical spondylosis. No subluxations fractures or lytic lesions. Anterior osteophyte formation is noted. Lumbar Spine X-ray   Date: 2021, Results: Review of the lumbar x-rays today demonstrate a stable L4-5 fusion. She has a retrolisthesis at L3-4 approximately 4 mm. It reduces completely with flexion.  She has no fracture lytic lesions.     Other Problems   Depression    High blood pressure    Allergic Urticaria    Treatment options were discussed with the patient in full.             Physical Exam   Neurologic  Sensory  Light Touch - Intact - Globally. Overall Assessment of Muscle Strength and Tone reveals  Upper Extremities - Right Deltoid - 5/5. Left Deltoid - 5/5. Right Bicep - 5/5. Left Bicep - 5/5. Right Tricep - 5/5. Left Tricep - 5/5. Right Wrist Extensors - 5/5. Left Wrist Extensors - 5/5. Right Wrist Flexors - 5/5. Left Wrist Flexors - 5/5. Right Intrinsics - 5/5. Left Intrinsics - 5/5. Lower Extremities - Right Iliopsoas - 5/5. Left Iliopsoas - 5/5. Right Tibialis Anterior - 4-/5. Left Tibialis Anterior - 4-/5. Right Gastroc-Soleus - 5/5. Left Gastroc-Soleus - 5/5. Right EHL - 4-/5. Left EHL - 4/5. General Assessment of Reflexes  Right Hand - Rogel's sign is negative in the right hand. Left Hand - Rogel's sign is negative in the left hand. Right Ankle - Clonus is not present. Left Ankle - Clonus is not present. Reflexes (Dermatomes)  2/2 Normal - Left Achilles (L5-S2), Left Bicep (C5-6), Left Knee (L2-4), Left Tricep (C7-8), Left Brachioradialis (C5-6), Right Achilles (L5-S2), Right Bicep (C5-6), Right Knee (L2-4), Right Tricep (C7-8) and Right Brachioradialis (C5-6).    Musculoskeletal  Global Assessment  Examination of related systems reveals - well-developed, well-nourished, in no acute distress, alert and oriented x 3 and normal coordination. Gait and Station - normal gait and station and normal posture. Right Lower Extremity - normal range of motion without pain and no instability, subluxation or laxity. Left Lower Extremity - normal range of motion without pain and no instability, subluxation or laxity. Spine/Ribs/Pelvis  Cervical Spine - Evaluation of related systems reveals - no lymphadenopathy and neurovascularly intact bilaterally. Examination of the cervical spine reveals - no swelling, edema or erythema and normal sensation. Inspection and Palpation - Tenderness - moderate and localized.   Assessment of pain reveals the following findings: - Location - cervical area. ROJM - Flexion - 85 °. Right Lateral Flexion - 35 °. Left Lateral Flexion - 35 °. Extension - 70 °. Right Rotation - 80 °. Left Rotation - 80 °. Cervical Spine - Functional Testing - Foraminal Compression/Spurling's Test negative, Shoulder Depression Test negative, Upper Limb Tension Test negative. Thoracic (Dorsal) Spine - Examination of the thoracic spine reveals - no tenderness over thoracic vertebrae, no pain, normal sensation and normal thoracic spine movements. Lumbosacral Spine - Examination of the lumbosacral spine reveals - no tenderness to palpation, no pain, normal strength and tone, no laxity or crepitus, normal lumbosacral spine movements and no known fractures or deformities. Functional Testing - Babinski Test negative, Prone Knee Bending Test negative, Slump Test negative, Straight Leg Raising Test negative.           Assessment & Plan      REVIEW OF SYSTEMS: Systems were reviewed by the provider.(V49.9)     Current Plans  Pt Education - How to Access Health Information Online using Patient Portal and 3rd Party Apps: discussed with patient and provided information. X-RAY EXAM OF CERVICAL SPINE MIN 4 VIEWS (95005) (AP, Lateral and Flexion and Extension views were taken today using Digital Radiography.)  X-RAY EXAM OF LUMBAR SPINE, 4 OR MORE VIEWS (94202) (AP, Lateral, Flexion and Extension views were taken today using Digital Radiography.)  Continued Gabapentin 300 MG Oral Capsule, 2 (two) Capsule at bedtime and 1 in the morning, #90, 11/01/2021, Ref. x1.     Lumbar stenosis with neurogenic claudication (724.03  M48.062)        S/P lumbar fusion (V45.4  Z98.1)  Impression: She overall is struggling with the increased pain above her previous fusion. She has failed conservative treatment including injection therapy physical therapy over the last year. We do lengthy discussion about treatment options.  I think she is a candidate for a revision laminectomy at L3-4 and L4-5 with a posterior lumbar fusion from L3-L5. Risk and benefits were discussed with her.     The risks and benefits were discussed at length with the patient and the patient has elected to proceed. Indications for surgery include failed conservative treatment. Alternative treatments, risks and the perioperative course were discussed with the patient. All questions were answered. The risks and benefits of the procedure were explained. Benefits include definitive diagnosis, relief of pain, elimination of deformity and improved function. Risks of surgery including bleeding, infection, weakness, numbness, CSF leak, failure to improve symptoms, exacerbation of medical co-morbidities and even death were discussed with the patient.        Spondylolisthesis (Acquired) (738.4  M43.10)        Treatment options were discussed with the patient in full.(V65.49)     Current Plans  Presurgical planning was preformed with the patient today  Surgery to be scheduled  Procedure: Revision laminectomy L3-4 L4-5 with revision fusion L3-L5     Date of Surgery Update:  Mt Marie was seen and examined. History and physical has been reviewed. The patient has been examined.  There have been no significant clinical changes since the completion of the originally dated History and Physical.     Signed By: Nikita Rizo MD      December 15, 2021 7:18 AM                   Signed by Nikita Rizo MD

## 2021-12-16 NOTE — PROGRESS NOTES
Orthopedic Spine Progress Note  Post Op day: 1 Day Post-Op    2021 9:24 AM   Admit Date: 12/15/2021  Procedure: Procedure(s):  REVISION LAMINECTOMY L3-4, L4-5 WITH REVISION FUSION L3-L5     Subjective:     Darylene Ave has no complaints. She states her lower extremity radicular symptoms are improved compared to prior to surgery. Pain is well controlled. Tolerating diet. No N/V. Pain Control:   Pain Assessment  Pain Scale 1: Numeric (0 - 10)  Pain Intensity 1: 5  Pain Onset 1: post o-p  Pain Location 1: Back  Pain Orientation 1: Posterior  Pain Description 1: Constant  Pain Intervention(s) 1: Medication (see MAR)    Objective:          Physical Exam:  General:  Alert and oriented. No acute distress. Heart:  Respirations unlabored. Abdomen:   Extremities: Soft, non-tender. No evidence of cyanosis. Pulses palpable in both upper and lower extremities. Neurologic:  Musculoskeletal:  No new motor deficits. Neurovascular exam within normal limits. Sensation stable. Motor: unchanged C5-T1 and L2-S1. Ting's sign negative in bilateral lower extremities. Calves soft, nontender upon palpation and with passive twitch. Moves both upper and lower extremities. Incision: clean, dry, and intact. No significant erythema or swelling. No active drainage noted. Vital Signs:    Blood pressure (!) 94/59, pulse 71, temperature 97.9 °F (36.6 °C), resp. rate 18, height 5' 5\" (1.651 m), weight 264 lb 1.8 oz (119.8 kg), SpO2 96 %.   Temp (24hrs), Av.7 °F (36.5 °C), Min:97.3 °F (36.3 °C), Max:97.9 °F (36.6 °C)      LAB:    Recent Labs     21  0235   HGB 10.8*     Lab Results   Component Value Date/Time    Sodium 136 2021 02:35 AM    Potassium 4.3 2021 02:35 AM    Chloride 104 2021 02:35 AM    CO2 25 2021 02:35 AM    Glucose 110 (H) 2021 02:35 AM    BUN 24 (H) 2021 02:35 AM    Creatinine 1.03 (H) 2021 02:35 AM    Calcium 8.3 (L) 2021 02:35 AM       Intake/Output:No intake/output data recorded. 12/14 1901 - 12/16 0700  In: 1020 [I.V.:1020]  Out: 900 [Urine:250; Drains:550]    PT/OT:   Gait:                    Assessment:   Patient is 1 Day Post-Op s/p Procedure(s):  REVISION LAMINECTOMY L3-4, L4-5 WITH REVISION FUSION L3-L5     Plan:     1. Continue PT/OT  2. Continue established methods of pain control  3. VTE Prophylaxes - TEDS &/or SCDs   4. Advance diet  5. Drain output 100 cc overnight; DC drain when < 80 cc per 8 hour shift   6.   Discharge pending       Signed By: SUBHA Purvis

## 2021-12-16 NOTE — PROGRESS NOTES
Transition of Care Plan   RUR- Low 6%    DISPOSITION: The disposition plan is home with family assistance   F/U with PCP/Specialist     Transport: Family;  Elsy Lower [phone]  Reason for Admission:  Lumbar Stenosis     Plan for utilizing home health: The pt has declined [facility] at this time     PCP: First and Last name:  Junie العراقي MD     Name of Practice:    Are you a current patient: Yes/No: Yes    Approximate date of last visit: Monday    Can you participate in a virtual visit with your PCP:                     Current Advanced Directive/Advance Care Plan: Full Code      Healthcare Decision Maker:   Click here to complete [address] including selection of the Healthcare Decision Maker Relationship (ie \"Primary\")                             Transition of Care Plan: This cm met the pt at bedside to conduct the initial evaluation. The pt presented as aox4. The pt confirmed her demographics and insurance provider. The pt's preferred pharmacy is [facility] on [address]. She reported that she currently employed with [employer] as a . She reported that she will transition to her sister's home for 5 days once discharged. She reported that she does not feel she will require hh at discharge. She reported that she utilizes a cpap nightly. She reported that she is independent with ADLs and IADLs at baseline. This cm will continue to follow for WENDY needs. Care Management Interventions  PCP Verified by CM: Yes  Mode of Transport at Discharge:  Other (see comment) (Sister)  Transition of Care Consult (CM Consult): Discharge Planning  MyChart Signup: Yes  Discharge Durable Medical Equipment: No  Physical Therapy Consult: Yes  Occupational Therapy Consult: Yes  Speech Therapy Consult: No  Support Systems: Other Family Member(s)  Confirm Follow Up Transport: Family  The Patient and/or Patient Representative was Provided with a Choice of Provider and Agrees with the Discharge Plan?: Yes  Freedom of Choice List was Provided with Basic Dialogue that Supports the Patient's Individualized Plan of Care/Goals, Treatment Preferences and Shares the Quality Data Associated with the Providers?: Yes  Discharge Location  Discharge Placement: Home  [facility] [address] [facility],   [phone]'

## 2021-12-16 NOTE — OP NOTES
[address]   OPERATIVE REPORT    Name:  Pippa Myers  MR#:  [id number]  :  1970  ACCOUNT #:  [de-identified]  DATE OF SERVICE:  12/15/2021    PREOPERATIVE DIAGNOSES:  Status post L4-5 fusion, lumbar stenosis L2-3, L3-4 with lumbar retrolisthesis L3-5. POSTOPERATIVE DIAGNOSES:  Status post L4-5 fusion, lumbar stenosis L2-3, L3-4 with lumbar retrolisthesis L3-5. PROCEDURES PERFORMED:  Exploration of fusion with a revision laminectomy L2-3, L3-4 with facetectomy. Christie osteotomy L3-4 bilaterally. Revision fusion L3 to L5 with segmental instrumentation. SURGEON:  Leeroy Francois MD    ASSISTANT:  Gerold Angelucci, PA-C    ANESTHESIA:  General.    COMPLICATIONS:  None    SPECIMENS REMOVED:  None    IMPLANTS:  Medtronic Solera and Medtronic Elevate    ESTIMATED BLOOD LOSS:  None    INDICATIONS:  This is a pleasant 17-year-old female who is approximately 5 years out from a lumbar laminectomy and fusion L4-5, now with junctional stenosis at L3-4 with breakdown and spondylolisthesis. The patient had failed conservative treatment, understood the risks and benefits, elected to proceed. PROCEDURE:  The patient was identified, brought to the operative suite, underwent general anesthesia without difficulty. Preoperative neuromonitoring was placed, baselines obtained, these remained stable throughout the surgical procedure. Perioperative antibiotics given to the patient. The patient was then placed in the prone position on the open Cliff table with all bony prominences well padded. Back was prepped and draped sterilely. Time-out was confirmed. Midline incision was made. Dissection was carried down. We exposed carefully the previous instrumentation hardware at L4-5 and the hardware was intact. The fusion mass was intact. We exposed the transverse process of L3 and exposed the residual L3 lamina as well as the inferior portion of L2.   We then started a facetectomy with a Midas bur and did central canal decompression. Careful dissection of the midline. Severe lateral recess and foraminal stenosis due to facet hypertrophy and ligamentum hypertrophy particularly at the L3-4 facet. We did full facetectomy for decompression of the transversing L4 nerve root. Ligamentum was also removed with undercutting the superior articular process and removal of the remainder of the ligamentum there. The exiting L3 nerve root was identified bilaterally around the pedicles. W also continued the facetectomy through the pars region at L3-4 bilaterally for bilateral Christie osteotomies. We continued decompression with undercutting the L2 lamina, carrying this out proximally until there was release of ligamentum with a partial L2-3 facetectomy, this was done bilaterally for further decompression of the exiting L3 nerve root. After satisfactory compression and hemostasis with bipolar cautery, we then cannulated the pedicles using a standard fluoroscopic technique. We did a Midas bur to create a  hole, followed by a gearshift to 45 mm. This was tested with neuromonitoring. We also tested the hole with a ball-tip probe. We then tapped and then placed Medtronic Solera pedicle screws 6.5 x 45 mm at each level and these tested out well. We then removed the previous instrumentation hardware without difficulty. We then replaced them with Medtronic Solera screws of similar size. Decorticated the transverse processes of L3 and L4 and L5. We then did bilateral annulotomies and then entered the disk space at L3-4. We did a complete diskectomy with pituitary rongeurs, curved curettes, and leandro. We sized this to approximately 10 mm of height. We then injected Joan allograft into the anterior one-third column followed by two Medtronic Catalyft grafts. These were 22 mm length and 9 mm in width and they were expanded to approximately 12 mm of height.   Good restoration of disk space height was obtained. Neuromonitoring was stable. Hemostasis with bipolar cautery. We then brought in x-ray which showed good implant positioning. 70 mm rods attached to the pedicle screws, set screws and final tightening performed. Compression across the L3-4 disk space for increased lumbar lordosis. Wounds thoroughly irrigated with Irrisept irrigation, pulse irrigation, followed by placement of local bone graft which had been morselized from the lamina and then this was mixed with remainder of the Joan allograft and Signafuse. The graft material was placed from L3 to L5 bilaterally in the posterior lateral gutters. Medium Hemovac placed. FloSeal for hemostasis. Vancomycin 1 g was sprinkled deep in the wound followed by #1 Stratafix, 2-0 Stratafix, and 3-0 Stratafix. The patient returned to the PACU in stable condition. The physician assistant was present during the entire operative procedure and assisted in all critical elements of the surgery. No surgical assist or resident was available.        MD GINNY [id number]  D:  12/15/2021 10:16  T:  12/15/2021 20:35  JOB #:  [id number]

## 2021-12-16 NOTE — PROGRESS NOTES
Problem: Mobility Impaired (Adult and Pediatric)  Goal: *Acute Goals and Plan of Care (Insert Text)  Description: FUNCTIONAL STATUS PRIOR TO ADMISSION: Patient was independent and active without use of DME.    HOME SUPPORT PRIOR TO ADMISSION: The patient lived alone with no local support. She reports she is going to stay with her sister on D/C x1 wk. Her sisters home does not have stairs. Physical Therapy Goals  Initiated 12/16/2021    1. Patient will move from supine to sit and sit to supine  in bed with independence within 4 days. 2. Patient will perform sit to stand with modified independence within 4 days. 3. Patient will ambulate with modified independence for 120 feet with the least restrictive device within 4 days. 4. Patient will ascend/descend 3 stairs with 1 handrail(s) with modified independence within 4 days. 5. Patient will verbalize and demonstrate understanding of spinal precautions (No bending, lifting greater than 5 lbs, or twisting; log-roll technique; frequent repositioning as instructed) within 4 days. 12/16/2021 1447 by Sera Dunaway, PT  Outcome: Progressing Towards Goal   PHYSICAL THERAPY TREATMENT  Patient: Kylah Romano (88 y.o. female)  Date: 12/16/2021  Diagnosis: Lumbar stenosis with neurogenic claudication [M48.062] <principal problem not specified>  Procedure(s) (LRB):  REVISION LAMINECTOMY L3-4, L4-5 WITH REVISION FUSION L3-L5  (N/A) 1 Day Post-Op  Precautions: Back  Chart, physical therapy assessment, plan of care and goals were reviewed. ASSESSMENT  Patient continues with skilled PT services and is progressing towards goals. She continues to report a headache with exertion, such as transitioning supine to sit. Patient ambulates and transfers with supervision. She is provided assistance for positioning her brace but is able to don herself. Patient ambulates increased distance this session with good balance and stability.  Minor LOB is noted with turning but patient is able to self recover without external support. Current Level of Function Impacting Discharge (mobility/balance): supervision- SBA     Other factors to consider for discharge: medical stability         PLAN :  Patient continues to benefit from skilled intervention to address the above impairments. Continue treatment per established plan of care. to address goals. Recommendation for discharge: (in order for the patient to meet his/her long term goals)  No skilled physical therapy/ follow up rehabilitation needs identified at this time. This discharge recommendation:  Has not yet been discussed the attending provider and/or case management    IF patient discharges home will need the following DME: none       SUBJECTIVE:   Patient stated i'm doing ok.     OBJECTIVE DATA SUMMARY:   Critical Behavior:  Neurologic State: Alert  Orientation Level: Oriented X4  Cognition: Appropriate decision making  Safety/Judgement: Awareness of environment  Functional Mobility Training:  Bed Mobility:     Supine to Sit: Stand-by assistance (use of bed rail )     Scooting: Stand-by assistance        Transfers:  Sit to Stand: Supervision  Stand to Sit: Supervision        Bed to Chair: Supervision                    Balance:  Sitting: Intact; Without support  Standing: Intact; Without support  Ambulation/Gait Training:  Distance (ft): 150 Feet (ft)  Assistive Device: Gait belt  Ambulation - Level of Assistance: Stand-by assistance        Gait Abnormalities: Decreased step clearance        Base of Support: Narrowed     Speed/Ashlee: Slow  Step Length: Right shortened;Left shortened                    Stairs: Therapeutic Exercises:     Pain Rating:      Activity Tolerance:   Good    After treatment patient left in no apparent distress:   Sitting in chair and Call bell within reach    COMMUNICATION/COLLABORATION:   The patients plan of care was discussed with: Registered nurse.      Jose Eduardo Jensen PT Time Calculation: 15 mins

## 2021-12-16 NOTE — PROGRESS NOTES
Problem: Mobility Impaired (Adult and Pediatric)  Goal: *Acute Goals and Plan of Care (Insert Text)  Description: FUNCTIONAL STATUS PRIOR TO ADMISSION: Patient was independent and active without use of DME.    HOME SUPPORT PRIOR TO ADMISSION: The patient lived alone with no local support. She reports she is going to stay with her sister on D/C x1 wk. Her sisters home does not have stairs. Physical Therapy Goals  Initiated 12/16/2021    1. Patient will move from supine to sit and sit to supine  in bed with independence within 4 days. 2. Patient will perform sit to stand with modified independence within 4 days. 3. Patient will ambulate with modified independence for 120 feet with the least restrictive device within 4 days. 4. Patient will ascend/descend 3 stairs with 1 handrail(s) with modified independence within 4 days. 5. Patient will verbalize and demonstrate understanding of spinal precautions (No bending, lifting greater than 5 lbs, or twisting; log-roll technique; frequent repositioning as instructed) within 4 days. 12/16/2021 1143 by Andres Sanchez PT  Outcome: Progressing Towards Goal   PHYSICAL THERAPY EVALUATION  Patient: Kristan Saxena (89 y.o. female)  Date: 12/16/2021  Primary Diagnosis: Lumbar stenosis with neurogenic claudication [M48.062]  Procedure(s) (LRB):  REVISION LAMINECTOMY L3-4, L4-5 WITH REVISION FUSION L3-L5  (N/A) 1 Day Post-Op   Precautions:   Back    ASSESSMENT  Based on the objective data described below, the patient presents with decreased independence with functional mobility post-op day 1 revision L3-4, L4-5 laminectomy and fusion. She is able to verbalize 3/3 back precautions as well as sitting limitations. She transitions supine to sit from a flat bed with use of bed rail and CGA. Patient demonstrates good sitting balance EOB. She is able to perform sit to stand with CGA. Brace is donned with assistance for positioning.  Patient is able ambulate about 40 ft before reporting bilateral LE weakness. She is returned to sitting EOB. BP is elevated. Patient is left in bedside chair with all needs met. Supine BP: 110/68  Sittin/73  Standin/98  Sitting post activity: 168/98    Current Level of Function Impacting Discharge (mobility/balance): CGA- Min A     Functional Outcome Measure: The patient scored 24/28 on the Tinetti outcome measure. Other factors to consider for discharge: medical stability, decreased balance, decreased strength endurance      Patient will benefit from skilled therapy intervention to address the above noted impairments. PLAN :  Recommendations and Planned Interventions: bed mobility training, transfer training, gait training, therapeutic exercises, neuromuscular re-education, edema management/control, patient and family training/education, and therapeutic activities      Frequency/Duration: Patient will be followed by physical therapy:  twice daily to address goals. Recommendation for discharge: (in order for the patient to meet his/her long term goals)  To be determined: DUNCAN v. None     This discharge recommendation:  Has not yet been discussed the attending provider and/or case management    IF patient discharges home will need the following DME: patient owns DME required for discharge         SUBJECTIVE:   Patient stated I have to have the same surgery on my neck .     OBJECTIVE DATA SUMMARY:   HISTORY:    Past Medical History:   Diagnosis Date    Chronic pain     back-low down right leg to foot    GERD (gastroesophageal reflux disease)     Hypertension     Psychiatric disorder     DEPRESSION    Sleep apnea     USES CPAP     Past Surgical History:   Procedure Laterality Date    HX BREAST BIOPSY Right     benign core bx    HX CHOLECYSTECTOMY      HX GYN       x 2    HX HERNIA REPAIR      umbilical    HX HYSTERECTOMY      HX LUMBAR FUSION      L4-L5     IR INJ FORAMIN EPID LUMB ANES/STER SNGL  9/15/2020 Personal factors and/or comorbidities impacting plan of care: please see above     Home Situation  Home Environment: Private residence  # Steps to Enter: 3  Rails to Enter: Yes  One/Two Story Residence: Two story  # of Interior Steps: 12  Living Alone: No  Support Systems: Other Family Member(s)  Patient Expects to be Discharged toF Cor[de-identified]ration (sisters home with no stairs )  Current DME Used/Available at Home: Walker, rolling  Tub or Shower Type: Tub/Shower combination    EXAMINATION/PRESENTATION/DECISION MAKING:   Critical Behavior:  Neurologic State: Alert  Orientation Level: Oriented X4  Cognition: Appropriate decision making,Appropriate for age attention/concentration,Appropriate safety awareness,Follows commands     Hearing:     Skin:    Edema:   Range Of Motion:  AROM: Within functional limits           PROM: Within functional limits           Strength:    Strength: Within functional limits                    Tone & Sensation:   Tone: Normal              Sensation: Impaired               Coordination:  Coordination: Within functional limits  Vision:      Functional Mobility:  Bed Mobility:     Supine to Sit: Contact guard assistance     Scooting: Stand-by assistance  Transfers:  Sit to Stand: Contact guard assistance  Stand to Sit: Contact guard assistance        Bed to Chair: Contact guard assistance              Balance:   Sitting: Intact; Without support  Standing: Intact; With support  Ambulation/Gait Training:  Distance (ft): 90 Feet (ft)  Assistive Device: Gait belt  Ambulation - Level of Assistance: Contact guard assistance        Gait Abnormalities: Decreased step clearance        Base of Support: Narrowed     Speed/Ashlee: Slow  Step Length: Right shortened;Left shortened                     Stairs:               Therapeutic Exercises:       Functional Measure:  Tinetti test:    Sitting Balance: 1  Arises: 1  Attempts to Rise: 2  Immediate Standing Balance: 2  Standing Balance: 2  Nudged: 1  Eyes Closed: 0  Turn 360 Degrees - Continuous/Discontinuous: 1  Turn 360 Degrees - Steady/Unsteady: 1  Sitting Down: 1  Balance Score: 12 Balance total score  Indication of Gait: 1  R Step Length/Height: 1  L Step Length/Height: 1  R Foot Clearance: 1  L Foot Clearance: 1  Step Symmetry: 1  Step Continuity: 1  Path: 2  Trunk: 2  Walking Time: 1  Gait Score: 12 Gait total score  Total Score: 24/28 Overall total score         Tinetti Tool Score Risk of Falls  <19 = High Fall Risk  19-24 = Moderate Fall Risk  25-28 = Low Fall Risk  Tinetti ME. Performance-Oriented Assessment of Mobility Problems in Elderly Patients. Tee 66; [id number]. (Scoring Description: PT Bulletin Feb. 10, 1993)    Older adults: Guillermo Silva et al, 2009; n = 1000 [facility] elderly evaluated with ABC, TONI, ADL, and IADL)  · Mean TONI score for males aged 69-68 years = 26.21(3.40)  · Mean TONI score for females age 69-68 years = 25.16(4.30)  · Mean TONI score for males over 80 years = 23.29(6.02)  · Mean TONI score for females over 80 years = 17.20(8.32)            Physical Therapy Evaluation Charge Determination   History Examination Presentation Decision-Making   HIGH Complexity :3+ comorbidities / personal factors will impact the outcome/ POC  LOW Complexity : 1-2 Standardized tests and measures addressing body structure, function, activity limitation and / or participation in recreation  MEDIUM Complexity : Evolving with changing characteristics  Other outcome measures Tinetti  MEDIUM      Based on the above components, the patient evaluation is determined to be of the following complexity level: MEDIUM    Pain Rating:      Activity Tolerance:   Fair    After treatment patient left in no apparent distress:   Sitting in chair, Call bell within reach, and Caregiver / family present    COMMUNICATION/EDUCATION:   The patients plan of care was discussed with: Occupational therapist and Registered nurse.      Fall prevention education was provided and the patient/caregiver indicated understanding., Patient/family have participated as able in goal setting and plan of care. , and Patient/family agree to work toward stated goals and plan of care.     Thank you for this referral.  Alexis Parker, PT   Time Calculation: 30 mins

## 2021-12-16 NOTE — PROGRESS NOTES
Problem: Self Care Deficits Care Plan (Adult)  Goal: *Acute Goals and Plan of Care (Insert Text)  Description: FUNCTIONAL STATUS PRIOR TO ADMISSION: Patient was independent and active without use of DME.    HOME SUPPORT: The patient lived alone with family to provide assistance, pt plans on being discharged to [facility]'s 1- level home    Occupational Therapy Goals  Initiated 12/16/2021    1. Patient will perform lower body dressing with modified independence using AE PRN within 4 days. 2.  Patient will perform toileting with modified independence using most appropriate DME within 4 days. 3.  Patient will grooming standing at sink modified independence within 4 days. 4.  Patient will don/doff back brace at modified independence within 4 days. 5.  Patient will verbalize/demonstrate 3/3 back precautions during ADL tasks without cues within 4 days. Outcome: Progressing Towards Goal   OCCUPATIONAL THERAPY EVALUATION  Patient: Leena Heaton (3 y.o. female)  Date: 12/16/2021  Primary Diagnosis: Lumbar stenosis with neurogenic claudication [M48.062]  Procedure(s) (LRB):  REVISION LAMINECTOMY L3-4, L4-5 WITH REVISION FUSION L3-L5  (N/A) 1 Day Post-Op   Precautions:   Back    ASSESSMENT  Based on the objective data described below, the patient presents with back precautions, decrease mobility, and decrease distal reach with LB self-care. Pt very pleasant and motivated to participate with OT. Supportive son present in the room. Reviewed and practiced use of AE for LB self-care. Demonstrate and verbalized understanding. Pt wanted a hip kit and provided one. Also reviewed toilet aide to assist with hygiene after toileting. Pt plans to purchase. Pt stood at supervision to re-set after sitting for >30 min. Feel pt would benefit from one additional OT session. No skilled OT recommended for discharge.       Current Level of Function Impacting Discharge (ADLs/self-care): supervision to CGA with mobilty, min to mod assist with LB self-care      Other factors to consider for discharge: none noted     Patient will benefit from skilled therapy intervention to address the above noted impairments. PLAN :  Recommendations and Planned Interventions: self care training, functional mobility training, patient education, and home safety training    Frequency/Duration: Patient will be followed by occupational therapy 5 times a week to address goals. Recommendation for discharge: (in order for the patient to meet his/her long term goals)  No skilled occupational therapy/ follow up rehabilitation needs identified at this time. This discharge recommendation:  A follow-up discussion with the attending provider and/or case management is planned    IF patient discharges home will need the following DME: none       SUBJECTIVE:   Patient stated I eventually need to have neck surgery.     OBJECTIVE DATA SUMMARY:   HISTORY:   Past Medical History:   Diagnosis Date    Chronic pain     back-low down right leg to foot    GERD (gastroesophageal reflux disease)     Hypertension     Psychiatric disorder     DEPRESSION    Sleep apnea     USES CPAP     Past Surgical History:   Procedure Laterality Date    HX BREAST BIOPSY Right     benign core bx    HX CHOLECYSTECTOMY      HX GYN       x 2    HX HERNIA REPAIR  286    umbilical    HX HYSTERECTOMY      HX LUMBAR FUSION      L4-L5     IR INJ FORAMIN EPID LUMB ANES/STER SNGL  9/15/2020       Expanded or extensive additional review of patient history:     Home Situation  Home Environment: Private residence  # Steps to Enter: 3  Rails to Enter: Yes  One/Two Story Residence: Two story  # of Interior Steps: 12  Living Alone: No  Support Systems: Other Family Member(s)  Patient Expects to be Discharged toF Cor[de-identified]ration (sisters home with no stairs )  Current DME Used/Available at Home: Walker, rolling  Tub or Shower Type: Tub/Shower combination    Hand dominance: Right    EXAMINATION OF PERFORMANCE DEFICITS:  Cognitive/Behavioral Status:  Neurologic State: Alert  Orientation Level: Oriented X4  Cognition: Appropriate decision making        Safety/Judgement: Awareness of environment    Skin: intact    Edema: none noted    Hearing:       Vision/Perceptual:                                     Range of Motion:    AROM: Within functional limits  PROM: Within functional limits                      Strength:    Strength: Within functional limits                Coordination:  Coordination: Within functional limits  Fine Motor Skills-Upper: Left Intact; Right Intact    Gross Motor Skills-Upper: Left Intact; Right Intact    Tone & Sensation:    Tone: Normal  Sensation: Impaired   Decrease dexterity with R hand d/t numbness/tingling/pain                   Balance:  Sitting: Intact; Without support  Standing: Intact; Without support    Functional Mobility and Transfers for ADLs:  Bed Mobility:  Supine to Sit: Contact guard assistance  Scooting: Stand-by assistance    Transfers:  Sit to Stand: Supervision  Stand to Sit: Supervision  Bed to Chair: Supervision  Bathroom Mobility: Contact guard assistance  Toilet Transfer : Supervision    ADL Assessment:  Feeding: Independent    Oral Facial Hygiene/Grooming: Independent    Bathing: Moderate assistance    Upper Body Dressing: Minimum assistance    Lower Body Dressing: Minimum assistance; Additional time; Adaptive equipment    Toileting: Minimum assistance                ADL Intervention and task modifications:       Educated and instructed pt with how to use AE for LB dressing.      Cognitive Retraining  Safety/Judgement: Awareness of environment      Occupational Therapy Evaluation Charge Determination   History Examination Decision-Making   LOW Complexity : Brief history review  LOW Complexity : 1-3 performance deficits relating to physical, cognitive , or psychosocial skils that result in activity limitations and / or participation restrictions  LOW Complexity : No comorbidities that affect functional and no verbal or physical assistance needed to complete eval tasks       Based on the above components, the patient evaluation is determined to be of the following complexity level: LOW   Pain Rating:  No c/o pain    Activity Tolerance:   Good    After treatment patient left in no apparent distress:    Sitting in chair, Call bell within reach, and Caregiver / family present    COMMUNICATION/EDUCATION:   The patients plan of care was discussed with: Physical therapist and Registered nurse. Home safety education was provided and the patient/caregiver indicated understanding., Patient/family have participated as able in goal setting and plan of care. , and Patient/family agree to work toward stated goals and plan of care. This patients plan of care is appropriate for delegation to [facility].     Thank you for this referral.  Hernandez Porter, OTR/L  Time Calculation: 31 mins

## 2021-12-16 NOTE — ADT AUTH CERT NOTES
[address]     FACILITY NPI :[id number]  FACILITY TAX ID :      [facility]  [address] [id number] [phone]          DEPT CONTACT:  Andrew Torres DO#[phone]  WOP#[phone]     Patient Name :Caleb Ireland   : 1970 (44 yrs)  MRN : [id number]     Patient Mailing Address [address] [[address]] , [address]                Insurance Plan Payor: BLUE CROSS / Plan: [place] / Product Type: PPO /      Primary Coverage Subscriber ID : [id number]     Secondary Coverage:  N/A         Current Patient Class : INPATIENT  Admit Date : 12/15/2021     REQUESTED LEVEL OF CARE: INPATIENT [101]                                                           Diagnosis : Lumbar stenosis with neurogenic claudication                          ICD10 Code : Lumbar stenosis with neurogenic claudication [M48.062]     Current Room and Bed 549/01     Admitting and Attending Info:  Admitting Provider : Kaya Sneed MD   NPI: [id number]  Admitting Provider Phone.  [phone]  Admitting Provider Address: [facility] [address]     Attending Provider Terence Mendes MD   [id number]  Attending Provider Address:  [address] [place], [place] [address]                                                   [address]     Attending Provider Phone: Attending phys phone: [phone]

## 2021-12-17 VITALS
HEART RATE: 80 BPM | WEIGHT: 264.11 LBS | HEIGHT: 65 IN | RESPIRATION RATE: 16 BRPM | OXYGEN SATURATION: 95 % | TEMPERATURE: 98.9 F | SYSTOLIC BLOOD PRESSURE: 113 MMHG | BODY MASS INDEX: 44 KG/M2 | DIASTOLIC BLOOD PRESSURE: 63 MMHG

## 2021-12-17 LAB
GLUCOSE BLD STRIP.AUTO-MCNC: 117 MG/DL (ref 65–117)
GLUCOSE BLD STRIP.AUTO-MCNC: 89 MG/DL (ref 65–117)
SERVICE CMNT-IMP: NORMAL
SERVICE CMNT-IMP: NORMAL

## 2021-12-17 PROCEDURE — 74011250637 HC RX REV CODE- 250/637: Performed by: PHYSICIAN ASSISTANT

## 2021-12-17 PROCEDURE — 97116 GAIT TRAINING THERAPY: CPT

## 2021-12-17 PROCEDURE — 82962 GLUCOSE BLOOD TEST: CPT

## 2021-12-17 RX ORDER — OXYCODONE HYDROCHLORIDE 5 MG/1
5 TABLET ORAL
Qty: 50 TABLET | Refills: 0 | Status: SHIPPED | OUTPATIENT
Start: 2021-12-17 | End: 2021-12-31

## 2021-12-17 RX ORDER — AMOXICILLIN 250 MG
1 CAPSULE ORAL 2 TIMES DAILY
Qty: 30 TABLET | Refills: 0 | Status: SHIPPED | OUTPATIENT
Start: 2021-12-17

## 2021-12-17 RX ADMIN — DULOXETINE HYDROCHLORIDE 90 MG: 60 CAPSULE, DELAYED RELEASE ORAL at 10:14

## 2021-12-17 RX ADMIN — GABAPENTIN 300 MG: 300 CAPSULE ORAL at 10:14

## 2021-12-17 RX ADMIN — HYDROCHLOROTHIAZIDE 12.5 MG: 25 TABLET ORAL at 10:14

## 2021-12-17 RX ADMIN — LOSARTAN POTASSIUM 100 MG: 50 TABLET, FILM COATED ORAL at 10:14

## 2021-12-17 RX ADMIN — DOCUSATE SODIUM 50 MG AND SENNOSIDES 8.6 MG 1 TABLET: 8.6; 5 TABLET, FILM COATED ORAL at 10:14

## 2021-12-17 RX ADMIN — AMLODIPINE BESYLATE 10 MG: 5 TABLET ORAL at 10:14

## 2021-12-17 RX ADMIN — ACETAMINOPHEN 1000 MG: 500 TABLET ORAL at 12:35

## 2021-12-17 RX ADMIN — Medication 10 ML: at 06:37

## 2021-12-17 RX ADMIN — POLYETHYLENE GLYCOL 3350 17 G: 17 POWDER, FOR SOLUTION ORAL at 10:16

## 2021-12-17 RX ADMIN — PANTOPRAZOLE SODIUM 40 MG: 40 TABLET, DELAYED RELEASE ORAL at 06:37

## 2021-12-17 RX ADMIN — METOPROLOL SUCCINATE 100 MG: 50 TABLET, EXTENDED RELEASE ORAL at 10:14

## 2021-12-17 RX ADMIN — OXYCODONE 10 MG: 5 TABLET ORAL at 10:14

## 2021-12-17 RX ADMIN — ACETAMINOPHEN 1000 MG: 500 TABLET ORAL at 05:39

## 2021-12-17 RX ADMIN — OXYCODONE 10 MG: 5 TABLET ORAL at 02:30

## 2021-12-17 RX ADMIN — OXYCODONE 10 MG: 5 TABLET ORAL at 05:39

## 2021-12-17 NOTE — DISCHARGE INSTRUCTIONS
After Hospital Care Plan:  Discharge Instructions Lumbar Fusion Surgery   Dr. Zulema Johnson   Patient Name: Jennifer Espinosa    Date of procedure: 12/15/2021     Procedure: Procedure(s):  REVISION LAMINECTOMY L3-4, L4-5 WITH REVISION FUSION L3-L5   PCP: Ronald Gongora MD    Follow up appointments  -follow up with  Dr. Zulema Johnson in 2 weeks. Call [phone] to make an appointment as soon as you get home from the hospital.    [address] [place], [place] [address]y: ____________________   phone: _______________________  The agency will contact you to arrange dates/times for visits. Please call them if you do not hear from them within 24 hours after you are discharged  Physical therapy 3 times a week for 3 weeks  Nursing-initial assessment and as needed    When to call your Orthopaedic Surgeon:  -Signs of infection-if your incision is red; continues to have drainage; drainage has a foul odor or if you have a persistent fever over 101 degrees for 24 hours  -Nausea or vomiting, severe headache  -Loss of bowel or bladder function, inability to urinate  -Changes in sensation in your arms or legs (numbness, tingling, loss of color)  -Increased weakness-greater than before your surgery  -Severe pain or pain not relieved by medications  -Signs of a blood clot in your leg-calf pain, tenderness, redness, swelling of lower leg    When to call your Primary Care Physician:  -Concerns about medical conditions such as diabetes, high blood pressure, asthma, congestive heart failure  -Call if blood sugars are elevated, persistent headache or dizziness, coughing or congestion, constipation or diarrhea, burning with urination, abnormal heart rate    When to call 911 and go to the nearest emergency room:  -Acute onset of chest pain, shortness of breath, difficulty breathing    Activity  -You are going home a well person, be as active as possible. Your only exercise should be walking.   Start with short frequent walks and increase your walking distance each day.  -Limit the amount of time you sit to 20-30 minute intervals. Sitting for prolonged periods of time will be uncomfortable for you following surgery.  -Do NOT lift anything over 5 pounds  -Do NOT do any straining, twisting or bending  -When you are in bed, you may lay on your back or on either side. Do NOT lie on your stomach    Brace  -If you have a back brace, you should wear your brace at all times when you are out of bed. Do not wear the brace while in bed or showering.  -Remember to always wear a cotton t-shirt underneath your brace.  -Do not bend or twist when your brace is off    Diet  -Resume usual diet; drink plenty of fluids; eat foods high in fiber  -It is important to have regular bowel movements. Pain medications may cause constipation. You may want to take a stool softener (such as Senokot-S or Colace) to prevent constipation.   -If constipation occurs, take a laxative (such as Dulcolax tablets, Milk of Magnesia, or a suppository). Laxatives should only be used if the above preventable measures have failed and you still have not had a bowel movement after three days    Driving  -You may not drive or return to work until instructed by your physician. However, you may ride in the car for short periods of time. Incision Care  -You may take brief showers but do not run the water run directly onto the wound. After showering or bathing, remove the wet dressing and gently blot the wound dry with a soft towel.  -Do not rub or apply any lotions or ointments to your incision site.   -Do not soak or scrub your wound  -Keep a dry dressing (ABD and paper tape) on your incision and have it changed daily for 14 days after surgery; more often if your incision is draining. Have your caregiver wash their hands thoroughly before changing your dressing.  -You will have absorbable sutures and steri-strips on your incision.  Steri-strips (small white pieces of tape) will fall off on their own; do not pick at your incision. Showering  -You may shower in approximately 4 days after your surgery.    -Leave the dressing on during your shower. Do NOT allow the water to run directly onto your dressing. Once you get out of the shower, gently pat the dressing dry. -Reminder- your brace can be removed while showering. Remember to not bend or twist while your brace is off.    -Do not take a tub bath. Preventing blood clots  -You have been given T.E.D. stockings to wear. Continue to wear these for 7 days after your discharge. Put them on in the morning and take them off at night.    -They are used to increase your circulation and prevent blood clots from forming in your legs  -T. E.D. stockings can be machine washed, temperature not to exceed 160° F (71°C) and machine dried for 15 to 20 minutes, temperature not to exceed 250° F (121°C). Pain management  -Take pain medication as prescribed; decrease the amount you use as your pain lessens  -DO not wait until you are in extreme pain to take your medication.  -Avoid alcoholic beverages while taking pain medication    Pain Medication Safety  DO:  -Read the Medication Guide   -Take your medicine exactly as prescribed   -Store your medicine away from children and in a safe place   -Flush unused medicine down the toilet   -Call your healthcare provider for medical advice about side effects. You may report side effects to FDA at 1-850-FDA-7516.   -Please be aware that many medications contain Tylenol. We do not want you to over medicate so please read the information below as a guide. Do not take more than 4 Grams of Tylenol in a 24 hour period.   (There are 1000 milligrams in one Gram)                                                                                                                                                                                                                                                Percocet contains 325 mg of Tylenol per tablet (do not take more than 12 tablets in 24 hours)  Lortab contains 500 mg of Tylenol per tablet (do not take more than 8 tablets in 24 hours)  Norco contains 325 mg of Tylenol per tablet (do not take more than 12 tablets in 24 hours). DO NOT:  -Do not give your medicine to others   -Do not take medicine unless it was prescribed for you   -Do not stop taking your medicine without talking to your healthcare provider   -Do not break, chew, crush, dissolve, or inject your medicine. If you cannot swallow your medicine whole, talk to your healthcare provider.  -Do not drink alcohol while taking this medicine  -Do not take anti-inflammatory medications or aspirin unless instructed by your     physician.

## 2021-12-17 NOTE — PROGRESS NOTES
Ortho Daily Progress Note    12/17/2021  12:21 PM    POD:  2 Days Post-Op  S/P:  Procedure(s):  REVISION LAMINECTOMY L3-4, L4-5 WITH REVISION FUSION L3-L5     Afebrile/VSS  Doing well without complaints of nausea  Pain well controlled  Calves soft/NTTP Bilaterally  Lab Results   Component Value Date/Time    HGB 10.8 (L) 12/16/2021 02:35 AM    INR 1.0 12/10/2021 03:44 PM     Incision OK; no drainage; intact with steri-strips  Dressings clean and dry  Moving LE well  Neurocirculatory exam WNL.     PLAN:  Tolerating regular diet  DVT prophylaxis-SCDs  PT/OT in lumbar brace  Pain Control  Drain d/c'd yesterday  Plan to D/C to home with family support today  Follow up in office in 2 weeks    SUBHA Dawkins

## 2021-12-17 NOTE — PROGRESS NOTES
I have reviewed discharge instructions with the patient. The patient verbalized understanding.         Problem: Patient Education: Go to Patient Education Activity  Goal: Patient/Family Education  Outcome: Resolved/Met     Problem: Complex Spine Procedure:  Day of Surgery  Goal: Off Pathway (Use only if patient is Off Pathway)  Outcome: Resolved/Met  Goal: Activity/Safety  Outcome: Resolved/Met  Goal: Consults, if ordered  Outcome: Resolved/Met  Goal: Diagnostic Test/Procedures  Outcome: Resolved/Met  Goal: Nutrition/Diet  Outcome: Resolved/Met  Goal: Discharge Planning  Outcome: Resolved/Met  Goal: Medications  Outcome: Resolved/Met  Goal: Respiratory  Outcome: Resolved/Met  Goal: Treatments/Interventions/Procedures  Outcome: Resolved/Met  Goal: Psychosocial  Outcome: Resolved/Met  Goal: *Optimal pain control at patient's stated goal  Outcome: Resolved/Met  Goal: *Demonstrates progressive activity  Outcome: Resolved/Met  Goal: *Respiratory status stable  Outcome: Resolved/Met     Problem: Complex Spine Procedure:  Post Op Day 1  Goal: Off Pathway (Use only if patient is Off Pathway)  Outcome: Resolved/Met  Goal: Activity/Safety  Outcome: Resolved/Met  Goal: Consults, if ordered  Outcome: Resolved/Met  Goal: Diagnostic Test/Procedures  Outcome: Resolved/Met  Goal: Nutrition/Diet  Outcome: Resolved/Met  Goal: Discharge Planning  Outcome: Resolved/Met  Goal: Medications  Outcome: Resolved/Met  Goal: Respiratory  Outcome: Resolved/Met  Goal: Treatments/Interventions/Procedures  Outcome: Resolved/Met  Goal: Psychosocial  Outcome: Resolved/Met  Goal: *Progress independence mobility/activities (eg: Mobility precautions)  Outcome: Resolved/Met  Goal: *Verbalizes/demonstrates understanding of proper body mechanics and use of stabilization device if ordered  Outcome: Resolved/Met  Goal: *Optimal pain control at patient's stated goal  Outcome: Resolved/Met  Goal: *Resumes normal function of bladder and bowel  Outcome: Resolved/Met  Goal: *Hemodynamically stable  Outcome: Resolved/Met  Goal: *Tolerating diet  Outcome: Resolved/Met  Goal: *Labs within defined limits  Outcome: Resolved/Met     Problem: Complex Spine Procedure:  Post Op Day 2  Goal: Off Pathway (Use only if patient is Off Pathway)  Outcome: Resolved/Met  Goal: Activity/Safety  Outcome: Resolved/Met  Goal: Diagnostic Test/Procedures  Outcome: Resolved/Met  Goal: Nutrition/Diet  Outcome: Resolved/Met  Goal: Discharge Planning  Outcome: Resolved/Met  Goal: Medications  Outcome: Resolved/Met  Goal: Respiratory  Outcome: Resolved/Met  Goal: Treatments/Interventions/Procedures  Outcome: Resolved/Met  Goal: Psychosocial  Outcome: Resolved/Met  Goal: *Progress independence mobility/activities (eg: Mobility precautions)  Outcome: Resolved/Met  Goal: *Verbalizes/demonstrates understanding of proper body mechanics and use of stabilization device if ordered  Outcome: Resolved/Met  Goal: *Optimal pain control at patient's stated goal  Outcome: Resolved/Met  Goal: *Resumes normal function of bladder and bowel  Outcome: Resolved/Met  Goal: *Hemodynamically stable  Outcome: Resolved/Met  Goal: *Tolerating diet  Outcome: Resolved/Met  Goal: *Labs within defined limits  Outcome: Resolved/Met  Goal: *Able to place stabilization device  Outcome: Resolved/Met     Problem: Complex Spine Procedure:  Post Op Day 3  Goal: Off Pathway (Use only if patient is Off Pathway)  Outcome: Resolved/Met  Goal: Activity/Safety  Outcome: Resolved/Met  Goal: Nutrition/Diet  Outcome: Resolved/Met  Goal: Discharge Planning  Outcome: Resolved/Met  Goal: Medications  Outcome: Resolved/Met  Goal: Respiratory  Outcome: Resolved/Met  Goal: Treatments/Interventions/Procedures  Outcome: Resolved/Met  Goal: Psychosocial  Outcome: Resolved/Met     Problem: Complex Spine Procedure:  Discharge Outcomes  Goal: *Optimal pain control at patient's stated goal  Outcome: Resolved/Met  Goal: *Demonstrates ability to place and remove stabilization device  Outcome: Resolved/Met  Goal: *Progress independence mobility/activities (eg: Mobility precautions)  Outcome: Resolved/Met  Goal: *Resumes normal function of bladder and bowel  Outcome: Resolved/Met  Goal: *Lungs clear or at baseline  Outcome: Resolved/Met  Goal: *Verbalizes name, dosage, time, side effects, and number of days to continue medications  Outcome: Resolved/Met  Goal: *Modified independence with transfers, ambulation on levels with assistance devices, stair climbing, ADL's  Outcome: Resolved/Met  Goal: *Describes follow-up/return visits to physicians  Outcome: Resolved/Met  Goal: *Describes available resources and support systems  Outcome: Resolved/Met  Goal: *Labs within defined limits  Outcome: Resolved/Met  Goal: *Tolerating diet  Outcome: Resolved/Met     Problem: Falls - Risk of  Goal: *Absence of Falls  Description: Document Mervin Fall Risk and appropriate interventions in the flowsheet.   Outcome: Resolved/Met     Problem: Patient Education: Go to Patient Education Activity  Goal: Patient/Family Education  Outcome: Resolved/Met

## 2021-12-17 NOTE — PROGRESS NOTES
Problem: Mobility Impaired (Adult and Pediatric)  Goal: *Acute Goals and Plan of Care (Insert Text)  Description: FUNCTIONAL STATUS PRIOR TO ADMISSION: Patient was independent and active without use of DME.    HOME SUPPORT PRIOR TO ADMISSION: The patient lived alone with no local support. She reports she is going to stay with her sister on D/C x1 wk. Her sisters home does not have stairs. Physical Therapy Goals  Initiated 12/16/2021    1. Patient will move from supine to sit and sit to supine  in bed with independence within 4 days. 2. Patient will perform sit to stand with modified independence within 4 days. 3. Patient will ambulate with modified independence for 120 feet with the least restrictive device within 4 days. 4. Patient will ascend/descend 3 stairs with 1 handrail(s) with modified independence within 4 days. 5. Patient will verbalize and demonstrate understanding of spinal precautions (No bending, lifting greater than 5 lbs, or twisting; log-roll technique; frequent repositioning as instructed) within 4 days. Outcome: Progressing Towards Goal  PHYSICAL THERAPY NOTE  Patient: Dahlia English (58 y.o. female)  Date: 12/17/2021  Primary Diagnosis: Lumbar stenosis with neurogenic claudication [M48.062]  Procedure(s) (LRB):  REVISION LAMINECTOMY L3-4, L4-5 WITH REVISION FUSION L3-L5  (N/A) 2 Days Post-Op   Precautions: Fall, Back    Dahlia English was seen for morning PT session. Pt received in sidelying and continues to demo SBA-Supervision for bed mobility and transfers. She was able to crow her quick draw brace w/ Supervision/set-up. She is walking 200 FT w/o A.D. Her gait remains steady. Patient was instructed in stair management and able to negotiate 12 stairs using single rail w/ CGA. Reviewed activity recommendations and restrictions with patient. Dahlia English is clear for discharge home from a mobility standpoint and RN is aware.          Recommendation for discharge: (in order for the patient to meet his/her long term goals)  No skilled physical therapy/ follow up rehabilitation needs identified at this time. This discharge recommendation:  Has not yet been discussed the attending provider and/or case management     IF patient discharges home will need the following DME: none      Morning session functional mobility:  Bed Mobility:  Rolling:  (pt received in Right  sidelying)  Supine to Sit: Stand-by assistance (sidelying> sit)  Sit to Supine:  (remained OOB in chair)     Transfers:  Sit to Stand: Supervision  Stand to Sit: Supervision                       Balance:   Sitting: Intact  Standing: Intact; Without support  Ambulation/Gait Training:  Distance (ft): 200 Feet (ft)  Assistive Device: Brace/Splint;Gait belt  Ambulation - Level of Assistance: Stand-by assistance;Contact guard assistance        Gait Abnormalities: Antalgic;Decreased step clearance        Base of Support: Narrowed     Speed/Ashlee: Pace decreased (<100 feet/min); Slow  Step Length: Left shortened;Right shortened        Interventions: Safety awareness training        Stairs:  Number of Stairs Trained: 12  Stairs - Level of Assistance: Contact guard assistance;Assist X1       Thank you,  Zahra Saxena,PTA   Time Calculation: 24 mins

## 2021-12-21 NOTE — DISCHARGE SUMMARY
Spine Discharge Summary    Patient ID:  Jerry Lewis  [id number]  female  46 y.o.  1970    Admit date: 12/15/2021    Discharge date: 12/17/21    Admitting Physician: Benjie Bauman MD     Consulting Physician(s):   Treatment Team: Utilization Review: Marquis Cervantes RN; Care Manager: Shannan Gross    Date of Surgery:   12/15/2021     Preoperative Diagnosis:  STATUS POST LUMBAR FUSION/SPONDYLOLISTHESIS/STENOSIS    Postoperative Diagnosis:   STATUS POST LUMBAR FUSION/SPONDYLOLISTHESIS/STENOSIS    Procedure(s):  REVISION LAMINECTOMY L3-4, L4-5 WITH REVISION FUSION L3-L5      Anesthesia Type:   General     Surgeon: Shiv Leach MD                            HPI:  Pt is a 46 y.o. female who has a history of STATUS POST LUMBAR FUSION/SPONDYLOLISTHESIS/STENOSIS  with pain and limitations of activities of daily living who presents at this time for a REVISION LAMINECTOMY L3-4, L4-5 WITH REVISION FUSION L3-L5   following the failure of conservative management. PMH:   Past Medical History:   Diagnosis Date    Chronic pain     back-low down right leg to foot    GERD (gastroesophageal reflux disease)     Hypertension     Psychiatric disorder     DEPRESSION    Sleep apnea     USES CPAP       Body mass index is 43.95 kg/m². : A BMI > 30 is classified as obesity and > 40 is classified as morbid obesity. Medications upon admission :   Prior to Admission Medications   Prescriptions Last Dose Informant Patient Reported? Taking? AMLODIPINE BESYLATE, BULK, 12/15/2021 at Unknown time  Yes Yes   Sig: Take 10 mg by mouth daily. DULoxetine (CYMBALTA) 30 mg capsule 12/15/2021 at Unknown time  Yes Yes   Sig: Take 90 mg by mouth daily. gabapentin (NEURONTIN) 300 mg capsule 12/15/2021 at Unknown time  Yes Yes   Sig: Take 300 mg by mouth three (3) times daily. irbesartan-hydroCHLOROthiazide (AVALIDE) 300-12.5 mg per tablet 12/14/2021 at Unknown time  Yes Yes   Sig: Take 1 Tablet by mouth daily. methylphenidate ER 36 mg 24 hr tab 12/13/2021  Yes No   Sig: Take 36 mg by mouth as needed. metoprolol succinate (TOPROL-XL) 100 mg tablet 12/15/2021 at Unknown time  No Yes   Sig: Take 1 Tablet by mouth daily. omeprazole (PriLOSEC OTC) 20 mg tablet 12/15/2021 at Unknown time  Yes Yes   Sig: Take 20 mg by mouth daily. Facility-Administered Medications: None        Allergies: Allergies   Allergen Reactions    Augmentin [Amoxicillin-Pot Clavulanate] Nausea and Vomiting        Hospital Course: The patient underwent surgery. Complications:  None; patient tolerated the procedure well. Was taken to the PACU in stable condition and then transferred to the ortho floor. Perioperative Antibiotics:  Ancef     Postoperative Pain Management:  Oxycodone & Tylenol     Postoperative transfusions:    Number of units banked PRBCs =   none     Post Op complications: none    Hemoglobin at discharge:    Lab Results   Component Value Date/Time    HGB 10.8 (L) 12/16/2021 02:35 AM    INR 1.0 12/10/2021 03:44 PM       Dressing was changed on POD # 1&2. Incision - clean, dry and intact. No significant erythema or swelling. Wound appears to be healing without any evidence of infection. Pt had a HVAC drain that was removed on POD# 1. Neurovascular exam found to be within normal limits. Physical Therapy started following surgery and participated in bed mobility, transfers and ambulation.         Gait:  Gait  Base of Support: Narrowed  Speed/Ashlee: Pace decreased (<100 feet/min),Slow  Step Length: Left shortened,Right shortened  Gait Abnormalities: Antalgic,Decreased step clearance  Ambulation - Level of Assistance: Stand-by assistance,Contact guard assistance  Distance (ft): 200 Feet (ft)  Assistive Device: Brace/Splint,Gait belt  Stairs - Level of Assistance: Contact guard assistance,Assist X1  Number of Stairs Trained: 12  Interventions: Safety awareness training            Interventions: Safety awareness training      Discharged to: Home. Condition on Discharge:   good    Discharge instructions:  - Take pain medications as prescribed  - Resume pre hospital diet      - Discharge activity: activity as tolerated  - Ambulate as tolerated  - Avoid bending, lifting and twisting  - Lumbar brace when oob  - Wound Care Keep wound clean and dry. See discharge instruction sheet. -DISCHARGE MEDICATION LIST     Discharge Medication List as of 12/17/2021 12:42 PM      START taking these medications    Details   oxyCODONE IR (ROXICODONE) 5 mg immediate release tablet Take 1 Tablet by mouth every four (4) hours as needed for Pain for up to 14 days. Max Daily Amount: 30 mg. Indications: pain, Normal, Disp-50 Tablet, R-0      senna-docusate (PERICOLACE) 8.6-50 mg per tablet Take 1 Tablet by mouth two (2) times a day., Normal, Disp-30 Tablet, R-0         CONTINUE these medications which have NOT CHANGED    Details   AMLODIPINE BESYLATE, BULK, Take 10 mg by mouth daily. , Historical Med      irbesartan-hydroCHLOROthiazide (AVALIDE) 300-12.5 mg per tablet Take 1 Tablet by mouth daily. , Historical Med      omeprazole (PriLOSEC OTC) 20 mg tablet Take 20 mg by mouth daily. , Historical Med      methylphenidate ER 36 mg 24 hr tab Take 36 mg by mouth as needed., Historical Med      gabapentin (NEURONTIN) 300 mg capsule Take 300 mg by mouth three (3) times daily. , Historical Med      metoprolol succinate (TOPROL-XL) 100 mg tablet Take 1 Tablet by mouth daily. , Print, Disp-90 Tablet, R-3      DULoxetine (CYMBALTA) 30 mg capsule Take 90 mg by mouth daily. , Historical Med          per medical continuation form      -Follow up in office in 2 weeks      Signed:  SUBHA Roa     12/21/2021  9:52 AM

## 2021-12-28 ENCOUNTER — OFFICE VISIT (OUTPATIENT)
Dept: ORTHOPEDIC SURGERY | Age: 51
End: 2021-12-28
Payer: COMMERCIAL

## 2021-12-28 VITALS — WEIGHT: 264 LBS | BODY MASS INDEX: 43.99 KG/M2 | HEIGHT: 65 IN

## 2021-12-28 DIAGNOSIS — Z98.1 S/P LUMBAR FUSION: Primary | ICD-10-CM

## 2021-12-28 PROCEDURE — 99024 POSTOP FOLLOW-UP VISIT: CPT | Performed by: ORTHOPAEDIC SURGERY

## 2021-12-28 NOTE — PROGRESS NOTES
Owen La (: 1970) is a 46 y.o. female, patient, here for evaluation of the following chief complaint(s):  Surgical Follow-up (Sx 12/15/2021  REVISION LAMINECTOMY L3-4, L4-5 WITH REVISION FUSION L3-L5 (PO#1))       ASSESSMENT/PLAN:  Below is the assessment and plan developed based on review of pertinent history, physical exam, labs, studies, and medications. Patient presents today approximately 2 weeks status post recent revision fusion. I am happy with her progress thus far. She does have some residual numbness radiating into her left hip and the lateral aspect of her left thigh. Additionally, she is having some left heel and calf pain which she attributes to an Achilles injury prior to surgery. Discussed with the patient to call our office if she notes any swelling in the left lower extremity or tenderness to palpation. Radiologic findings reviewed with the patient. She will remain in her [place] until her next visit. Continued restrictions discussed. Informed the patient to call the office with any worsening of symptoms. She will follow up in 4 weeks. 1. S/P lumbar fusion  -     XR SPINE LUMB 2 OR 3 V; Future  -     gabapentin (NEURONTIN) 300 mg capsule; Take 1 Capsule by mouth three (3) times daily. Max Daily Amount: 900 mg., Normal, Disp-180 Capsule, R-02 tabs in the morning, 2 tabs at lunch and 2 tabs at night      Return in about 4 weeks (around 2022). SUBJECTIVE/OBJECTIVE:  Owen La (: 1970) is a 46 y.o. female. No flowsheet data found. Patient presents today approximately 2 weeks status post recent revision fusion. She is doing well overall. She does report some pain radiating into her left hip and lateral aspect of her upper thigh, she describes the pain as pins-and-needles. Additionally, she states she strained her left Achilles prior to surgery and is having some residual left heel and calf pain.  She is taking Tylenol for pain as well as gabapentin 2 tabs 3 times daily. She states she stopped taking narcotic pain medications approximately 4 days status post surgery. She has been compliant with her bending, lifting and twisting restrictions. She states she has been compliant with wearing her [place]. Imaging:    XR Results (most recent):  Results from Appointment encounter on 12/28/21    XR SPINE LUMB 2 OR 3 V    Narrative  AP and lateral lumbar spine demonstrates a stable L3-L5 revision fusion. No acute hardware difficulties       MRI Results (most recent):  Results from Hospital Encounter encounter on 11/11/20    MRI CERV SPINE WO CONT    Narrative  Clinical history: Neck pain  INDICATION:   Neck pain  COMPARISON: 6/18/2018    TECHNIQUE: MR imaging of the cervical spine was performed including sagittal T1,  T2, STIR;  axial GRE, T1. Contrast was not administered. 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 Minimal cord compression at  C4-5. Aniyah Ciarra Vertebral body heights are maintained. Marrow signal is normal.  The  craniocervical junction is intact. No Chiari or syrinx. No cord myelomalacia. .      C2/3:   The spinal canal and neuroforamina are widely patent. C3/4:  Mild facet arthropathy and uncovertebral degenerative change is greater  on the right. Disc protrusion/osteophyte asymmetric to the right. Canal is  patent. There is moderate to severe right foraminal stenosis. .    C4/5:  Mild facet arthropathy. Mild broad-based osteophyte with superimposed  left foraminal protrusion. Moderate to severe canal stenosis with minimal cord  compression. Severe left and right foraminal stenosis. .    C5/6:  Mild facet arthropathy. Mild right left foraminal protrusion/osteophyte. Mild canal and severe left foraminal stenosis. C6/7:  Disc bulge/osteophyte is broad-based. Mild facet arthropathy. Mild canal  stenosis. Mild bilateral foraminal stenoses. .    C7/T1:  Disc bulge/osteophyte. Minimal canal stenosis.  Foramina are patent. Impression  IMPRESSION:  Interval progression of disc degenerative change since the 6/18/2018  examination. There is currently moderate to severe canal stenosis and severe bilateral  foraminal stenosis at C4-C5. Minimal cord compression at C4-5 without cord  signal abnormalities. Mild canal and severe left foraminal stenosis at C5-6. Moderate to severe right foraminal stenosis at C3-4. Additional, less severe degenerative findings are as described in detail above. .         Allergies   Allergen Reactions    Augmentin [Amoxicillin-Pot Clavulanate] Nausea and Vomiting       Current Outpatient Medications   Medication Sig    gabapentin (NEURONTIN) 300 mg capsule Take 1 Capsule by mouth three (3) times daily. Max Daily Amount: 900 mg.    oxyCODONE IR (ROXICODONE) 5 mg immediate release tablet Take 1 Tablet by mouth every four (4) hours as needed for Pain for up to 14 days. Max Daily Amount: 30 mg. Indications: pain    senna-docusate (PERICOLACE) 8.6-50 mg per tablet Take 1 Tablet by mouth two (2) times a day.  AMLODIPINE BESYLATE, BULK, Take 10 mg by mouth daily.  irbesartan-hydroCHLOROthiazide (AVALIDE) 300-12.5 mg per tablet Take 1 Tablet by mouth daily.  omeprazole (PriLOSEC OTC) 20 mg tablet Take 20 mg by mouth daily.  methylphenidate ER 36 mg 24 hr tab Take 36 mg by mouth as needed.  gabapentin (NEURONTIN) 300 mg capsule Take 300 mg by mouth three (3) times daily.  metoprolol succinate (TOPROL-XL) 100 mg tablet Take 1 Tablet by mouth daily.  DULoxetine (CYMBALTA) 30 mg capsule Take 90 mg by mouth daily. No current facility-administered medications for this visit.        Past Medical History:   Diagnosis Date    Chronic pain     back-low down right leg to foot    GERD (gastroesophageal reflux disease)     Hypertension     Psychiatric disorder     DEPRESSION    Sleep apnea     USES CPAP        Past Surgical History:   Procedure Laterality Date    HX BREAST BIOPSY Right     benign core bx    HX CHOLECYSTECTOMY      HX GYN       x 2    HX HERNIA REPAIR  4418    umbilical    HX HYSTERECTOMY      HX LUMBAR FUSION  2016    L4-L5     IR INJ FORAMIN EPID LUMB ANES/STER SNGL  9/15/2020       Family History   Problem Relation Age of Onset    Hypertension Mother     OSTEOARTHRITIS Mother     Hypertension Father     OSTEOARTHRITIS Father     Breast Cancer Sister 46    Cancer Sister         BREAST CA AGE 46    Anesth Problems Neg Hx         Social History     Tobacco Use    Smoking status: Current Every Day Smoker     Packs/day: 1.00     Years: 20.00     Pack years: 20.00    Smokeless tobacco: Never Used   Vaping Use    Vaping Use: Never used   Substance Use Topics    Alcohol use: Yes     Comment: social    Drug use: Never        Review of Systems   Musculoskeletal: Positive for back pain and myalgias. All other systems reviewed and are negative. Vitals:  Ht 5' 5\" (1.651 m)   Wt 264 lb (119.7 kg)   BMI 43.93 kg/m²    Body mass index is 43.93 kg/m². Physical Exam  Integumentary  Assessment of Surgical Incision - healing and consistent with normal anticipated wound healing. Wound edges well approximated, nontender to palpation, nonfluctuant. Neurologic  Sensory  Light Touch - Intact - Globally. Overall Assessment of Muscle Strength and Tone reveals  Lower Extremities - Right Iliopsoas - 5/5. Left Iliopsoas - 5/5. Right Tibialis Anterior - 5/5. Left Tibialis Anterior - 5/5. Right Gastroc-Soleus - 5/5. Left Gastroc-Soleus - 5/5. Right EHL - 5/5. Left EHL - 5/5. General Assessment of Reflexes  Right Ankle - Clonus is not present. Left Ankle - Clonus is not present. Reflexes (Dermatomes)  2/2 Normal - Left Achilles (L5-S2), Left Knee (L2-4), Right Achilles (L5-S2) and Right Knee (L2-4).     Musculoskeletal  Global Assessment  Examination of related systems reveals - well-developed, well-nourished, in no acute distress, alert and oriented x 3 and normal coordination. Spine/Ribs/Pelvis  Lumbosacral Spine - Examination of the lumbosacral spine reveals - no known fractures or deformities. Inspection and Palpation - Tenderness - moderate. Assessment of pain reveals the following findings - The pain is characterized as - moderate. Location - pain refers to lower back bilaterally. Dr. Timothy Baptiste was available for immediate consult during this encounter. An electronic signature was used to authenticate this note.   -- SUBHA Qureshi

## 2021-12-30 ENCOUNTER — PATIENT MESSAGE (OUTPATIENT)
Dept: ORTHOPEDIC SURGERY | Age: 51
End: 2021-12-30

## 2021-12-30 DIAGNOSIS — M43.16 SPONDYLOLISTHESIS OF LUMBAR REGION: Primary | ICD-10-CM

## 2021-12-30 RX ORDER — GABAPENTIN 300 MG/1
300 CAPSULE ORAL 3 TIMES DAILY
Qty: 180 CAPSULE | Refills: 0 | Status: SHIPPED | OUTPATIENT
Start: 2021-12-30

## 2021-12-30 NOTE — TELEPHONE ENCOUNTER
Dr. Patricia Becerril  Discussed in clinic on 12/28 that she was using amy 600mg three times a day and the PA agreed to call in. From: Kylah Romano  To: Tracee Gongora MD  Sent: 12/30/2021 11:57 AM EST  Subject: Gabapentin     Hello, can you please check and see if new prescription was called in for me?

## 2021-12-31 RX ORDER — GABAPENTIN 600 MG/1
600 TABLET ORAL 3 TIMES DAILY
Qty: 90 TABLET | Refills: 1 | Status: SHIPPED | OUTPATIENT
Start: 2021-12-31

## 2022-01-27 ENCOUNTER — OFFICE VISIT (OUTPATIENT)
Dept: ORTHOPEDIC SURGERY | Age: 52
End: 2022-01-27
Payer: COMMERCIAL

## 2022-01-27 VITALS — HEIGHT: 65 IN | WEIGHT: 264 LBS | BODY MASS INDEX: 43.99 KG/M2

## 2022-01-27 DIAGNOSIS — Z98.1 S/P LUMBAR FUSION: Primary | ICD-10-CM

## 2022-01-27 DIAGNOSIS — M76.62 ACHILLES BURSITIS OF LEFT LOWER EXTREMITY: ICD-10-CM

## 2022-01-27 DIAGNOSIS — M51.36 LUMBAR DEGENERATIVE DISC DISEASE: ICD-10-CM

## 2022-01-27 PROCEDURE — 99214 OFFICE O/P EST MOD 30 MIN: CPT | Performed by: ORTHOPAEDIC SURGERY

## 2022-01-27 NOTE — PROGRESS NOTES
Darrell Lorenz (: 1970) is a 46 y.o. female, patient, here for evaluation of the following chief complaint(s):  Surgical Follow-up (Sx 12/15/2021 REVISION LAMINECTOMY L3-4, L4-5 WITH REVISION FUSION L3-L5 (PO#2))       ASSESSMENT/PLAN:    Below is the assessment and plan developed based on review of pertinent history, physical exam, labs, studies, and medications. At this point in terms of her lower back she is doing very well. She will slowly increase in activities and will continue working. Her only complaint this time is her left Achilles tendon. We will start some home exercises and anti-inflammatory for that. She feels improved will consider an MRI and referral.    1. S/P lumbar fusion  -     XR SPINE LUMB 2 OR 3 V; Future  2. Lumbar degenerative disc disease  3. Achilles bursitis of left lower extremity      No follow-ups on file. SUBJECTIVE/OBJECTIVE:  Darrell Lorenz (: 1970) is a 46 y.o. female. No flowsheet data found. She comes in today for follow-up of her revision lumbar laminectomy and extension of fusion L3-4. She is doing very well from that was just some mild mechanical back pain but is not taking any medications for that. She has returned to work today. She has mainly complaints of left-sided pain in her Achilles tendon which is new and separate from her lower back. She had this prior to surgery but was hoping it would get better after surgery. The pain is worse with walking and standing at work and with stretching. Imaging:          XR Results (most recent):  Results from Appointment encounter on 22    XR SPINE LUMB 2 OR 3 V    Narrative  AP and lateral demonstrates a stable L3-5 posterior lumbar fusion.        MRI Results (most recent):  Results from [place] encounter on 20    MRI CERV SPINE WO CONT    Narrative  Clinical history: Neck pain  INDICATION:   Neck pain  COMPARISON: 2018    TECHNIQUE: MR imaging of the cervical spine was performed including sagittal T1,  T2, STIR;  axial GRE, T1. Contrast was not administered. 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 Minimal cord compression at  C4-5. Lucy Fanning Vertebral body heights are maintained. Marrow signal is normal.  The  craniocervical junction is intact. No Chiari or syrinx. No cord myelomalacia. .      C2/3:   The spinal canal and neuroforamina are widely patent. C3/4:  Mild facet arthropathy and uncovertebral degenerative change is greater  on the right. Disc protrusion/osteophyte asymmetric to the right. Canal is  patent. There is moderate to severe right foraminal stenosis. .    C4/5:  Mild facet arthropathy. Mild broad-based osteophyte with superimposed  left foraminal protrusion. Moderate to severe canal stenosis with minimal cord  compression. Severe left and right foraminal stenosis. .    C5/6:  Mild facet arthropathy. Mild right left foraminal protrusion/osteophyte. Mild canal and severe left foraminal stenosis. C6/7:  Disc bulge/osteophyte is broad-based. Mild facet arthropathy. Mild canal  stenosis. Mild bilateral foraminal stenoses. .    C7/T1:  Disc bulge/osteophyte. Minimal canal stenosis. Foramina are patent. Impression  IMPRESSION:  Interval progression of disc degenerative change since the 6/18/2018  examination. There is currently moderate to severe canal stenosis and severe bilateral  foraminal stenosis at C4-C5. Minimal cord compression at C4-5 without cord  signal abnormalities. Mild canal and severe left foraminal stenosis at C5-6. Moderate to severe right foraminal stenosis at C3-4. Additional, less severe degenerative findings are as described in detail above. .         Allergies   Allergen Reactions    Augmentin [Amoxicillin-Pot Clavulanate] Nausea and Vomiting       Current Outpatient Medications   Medication Sig    gabapentin (NEURONTIN) 600 mg tablet Take 1 Tablet by mouth three (3) times daily.  Max Daily Amount: 1,800 mg.    gabapentin (NEURONTIN) 300 mg capsule Take 1 Capsule by mouth three (3) times daily. Max Daily Amount: 900 mg.    senna-docusate (PERICOLACE) 8.6-50 mg per tablet Take 1 Tablet by mouth two (2) times a day.  AMLODIPINE BESYLATE, BULK, Take 10 mg by mouth daily.  irbesartan-hydroCHLOROthiazide (AVALIDE) 300-12.5 mg per tablet Take 1 Tablet by mouth daily.  omeprazole (PriLOSEC OTC) 20 mg tablet Take 20 mg by mouth daily.  methylphenidate ER 36 mg 24 hr tab Take 36 mg by mouth as needed.  gabapentin (NEURONTIN) 300 mg capsule Take 300 mg by mouth three (3) times daily.  metoprolol succinate (TOPROL-XL) 100 mg tablet Take 1 Tablet by mouth daily.  DULoxetine (CYMBALTA) 30 mg capsule Take 90 mg by mouth daily. No current facility-administered medications for this visit.        Past Medical History:   Diagnosis Date    Chronic pain     back-low down right leg to foot    GERD (gastroesophageal reflux disease)     Hypertension     Psychiatric disorder     DEPRESSION    Sleep apnea     USES CPAP        Past Surgical History:   Procedure Laterality Date    HX BREAST BIOPSY Right     benign core bx    HX CHOLECYSTECTOMY      HX GYN       x 2    HX HERNIA REPAIR  4252    umbilical    HX HYSTERECTOMY      HX LUMBAR FUSION      L4-L5     IR INJ FORAMIN EPID LUMB ANES/STER SNGL  9/15/2020       Family History   Problem Relation Age of Onset    Hypertension Mother     OSTEOARTHRITIS Mother     Hypertension Father     OSTEOARTHRITIS Father     Breast Cancer Sister 46    Cancer Sister         BREAST CA AGE 46    Anesth Problems Neg Hx         Social History     Tobacco Use    Smoking status: Current Every Day Smoker     Packs/day: 1.00     Years: 20.00     Pack years: 20.00    Smokeless tobacco: Never Used   Vaping Use    Vaping Use: Never used   Substance Use Topics    Alcohol use: Yes     Comment: social    Drug use: Never        Review of Systems Vitals:  Ht 5' 5\" (1.651 m)   Wt 264 lb (119.7 kg)   BMI 43.93 kg/m²    Body mass index is 43.93 kg/m². Ortho Exam       Integumentary  Assessment of Surgical Incision - healing and consistent with normal anticipated wound healing. Neurologic  Sensory  Light Touch - Intact - Globally. Overall Assessment of Muscle Strength and Tone reveals  Lower Extremities - Right Iliopsoas - 5/5. Left Iliopsoas - 5/5. Right Tibialis Anterior - 5/5. Left Tibialis Anterior - 5/5. Right Gastroc-Soleus - 5/5. Left Gastroc-Soleus - 5/5. Right EHL - 5/5. Left EHL - 5/5. General Assessment of Reflexes  Right Ankle - Clonus is not present. Left Ankle - Clonus is not present. Reflexes (Dermatomes)  2/2 Normal - Left Achilles (L5-S2), Left Knee (L2-4), Right Achilles (L5-S2) and Right Knee (L2-4). Musculoskeletal  Global Assessment  Examination of related systems reveals - well-developed, well-nourished, in no acute distress, alert and oriented x 3 and normal coordination. Spine/Ribs/Pelvis  Lumbosacral Spine - Examination of the lumbosacral spine reveals - no known fractures or deformities. Inspection and Palpation - Tenderness - moderate. Assessment of pain reveals the following findings - The pain is characterized as - moderate. Location - pain refers to lower back bilaterally. She has tenderness on palpation of the insertion of the left Achilles tendon. No defects are noted. No significant swelling noted. No erythema noted. Increased pain with dorsiflexion. An electronic signature was used to authenticate this note.   -- Antoine Johnson MD

## 2022-01-27 NOTE — LETTER
1/27/2022    Patient: Radha Linares   YOB: 1970   Date of Visit: 1/27/2022     Jeannette Richards, [facility]  [address] [address]  Via Fax: [phone]    Dear Jeannette Richards MD,      Thank you for referring Ms. Armando Price to [facility] for evaluation. My notes for this consultation are attached. If you have questions, please do not hesitate to call me. I look forward to following your patient along with you.       Sincerely,    Isela Mayen MD

## 2022-01-27 NOTE — PATIENT INSTRUCTIONS
Achilles Tendon: Exercises  Introduction  Here are some examples of exercises for you to try. The exercises may be suggested for a condition or for rehabilitation. Start each exercise slowly. Ease off the exercises if you start to have pain. You will be told when to start these exercises and which ones will work best for you. How to do the exercises  Toe stretch    1. Sit in a chair, and extend your affected leg so that your heel is on the floor. 2. With your hand, reach down and pull your big toe up and back. Pull toward your ankle and away from the floor. 3. Hold the position for at least 15 to 30 seconds. 4. Repeat 2 to 4 times a session, several times a day. Calf-plantar fascia stretch    1. Sit with your legs extended and knees straight. 2. Place a towel around your foot just under the toes. 3. Hold each end of the towel in each hand, with your hands above your knees. 4. Pull back with the towel so that your foot stretches toward you. 5. Hold the position for at least 15 to 30 seconds. 6. Repeat 2 to 4 times a session, up to 5 sessions a day. Floor stretch    1. Stand about 2 feet from a wall, and place your hands on the wall at about shoulder height. Or you can stand behind a chair, placing your hands on the back of it for balance. 2. Step back with the leg you want to stretch. Keep the leg straight, and press your heel into the floor with your toe turned slightly in.  3. Lean forward, and bend your other leg slightly. Feel the stretch in the Achilles tendon of your back leg. Hold for at least 15 to 30 seconds. 4. Repeat 2 to 4 times a session, up to 5 sessions a day. Stair stretch    1. Stand with the balls of both feet on the edge of a step or curb (or a medium-sized phone book). With at least one hand, hold onto something solid for balance, such as a banister or handrail.   2. Keeping your affected leg straight, slowly let that heel hang down off of the step or curb until you feel a stretch in the back of your calf and/or Achilles area. Some of your weight should still be on the other leg. 3. Hold this position for at least 15 to 30 seconds. 4. Repeat 2 to 4 times a session, up to 5 times a day or whenever your Achilles tendon starts to feel tight. This stretch can also be done with your knee slightly bent. Strength exercise    1. This exercise will get you started on building strength after an Achilles tendon injury. Your doctor or physical therapist can help you move on to more challenging exercises as you heal and get stronger. 2. Stand on a step with your heel off the edge of the step. Hold on to a handrail or wall for balance. 3. Push up on your toes, then slowly count to 10 as you lower yourself back down until your heel is below the step. If it hurts to push up on your toes, try putting most of your weight on your other foot as you push up, or try using your arms to help you. If you can't do this exercise without causing pain, stop the exercise and talk to your doctor. 4. Repeat the exercise 8 to 12 times, half with the knee straight and half with the knee bent. Follow-up care is a key part of your treatment and safety. Be sure to make and go to all appointments, and call your doctor if you are having problems. It's also a good idea to know your test results and keep a list of the medicines you take. Where can you learn more? Go to http://www.gray.com/  Enter F199 in the search box to learn more about \"Achilles Tendon: Exercises. \"  Current as of: July 1, 2021               Content Version: 13.0  © 0106-7723 [employer]. Care instructions adapted under license by [employer] (which disclaims liability or warranty for this information).  If you have questions about a medical condition or this instruction, always ask your healthcare professional. [address] any warranty or liability for your use of this information.

## 2022-03-10 ENCOUNTER — OFFICE VISIT (OUTPATIENT)
Dept: ORTHOPEDIC SURGERY | Age: 52
End: 2022-03-10
Payer: COMMERCIAL

## 2022-03-10 VITALS — WEIGHT: 260 LBS | HEIGHT: 65 IN | BODY MASS INDEX: 43.32 KG/M2

## 2022-03-10 DIAGNOSIS — M54.50 CHRONIC RIGHT-SIDED LOW BACK PAIN WITHOUT SCIATICA: ICD-10-CM

## 2022-03-10 DIAGNOSIS — Z98.1 S/P LUMBAR FUSION: Primary | ICD-10-CM

## 2022-03-10 DIAGNOSIS — G89.29 CHRONIC RIGHT-SIDED LOW BACK PAIN WITHOUT SCIATICA: ICD-10-CM

## 2022-03-10 PROCEDURE — 99024 POSTOP FOLLOW-UP VISIT: CPT | Performed by: ORTHOPAEDIC SURGERY

## 2022-03-10 RX ORDER — AMLODIPINE BESYLATE 10 MG/1
TABLET ORAL
COMMUNITY
Start: 2022-03-06

## 2022-03-10 RX ORDER — METOPROLOL SUCCINATE 50 MG/1
1 TABLET, EXTENDED RELEASE ORAL
COMMUNITY
Start: 2021-08-27

## 2022-03-10 RX ORDER — GABAPENTIN 300 MG/1
CAPSULE ORAL
Qty: 90 CAPSULE | Refills: 0 | Status: SHIPPED | OUTPATIENT
Start: 2022-03-10 | End: 2022-04-29 | Stop reason: SDUPTHER

## 2022-03-10 NOTE — LETTER
CONTROLLED SUBSTANCE MEDICATION AGREEMENT  Patient Name: Larisa Armstrong  Patient YOB: 1970   [id number]  I understand, that controlled substance medications may be used to help better manage my symptoms and to improve my ability to function at home, work and in social settings. However, I also understand that these medications do have risks, which have been discussed with me, including possible development of physical or psychological dependence. I understand that successful treatment requires mutual trust and honesty between me and my provider. I understand and agree that following this Medication Agreement is necessary in continuing my provider-patient relationship and the success of my treatment plan. Explanation from my Provider: Benefits and Goals of Controlled Substance Medications: There are two potential goals for your treatment: (1) decreased pain and suffering (2) improved daily life functions. There are many possible treatments for your chronic condition(s). Alternatives such as physical therapy, yoga, massage, home daily exercise, meditation, relaxation techniques, injections, chiropractic manipulations, surgery, cognitive therapy, hypnosis and many medications that are not habit-forming may be used. Use of controlled substance medications may be helpful, but they are unlikely to resolve all symptoms or restore all function. Explanation from my Provider: Risks of Controlled Substance Medications:   Opioid pain medications: These medications can lead to problems such as addiction/dependence, sedation, lightheadedness/dizziness, memory issues, falls, constipation, nausea, or vomiting. They may also impair the ability to drive or operate machinery. Additionally, these medications may lower testosterone levels, leading to loss of bone strength, stamina and sex drive.   They may cause problems with breathing, sleep apnea and reduced coughing, which is especially dangerous for patients with lung disease. Overdose or dangerous interactions with alcohol and other medications may occur, leading to death. Hyperalgesia may develop, which means patients receiving opioids for the treatment of pain may become more sensitive to certain painful stimuli, and in some cases, experience pain from ordinarily non-painful stimuli. Women between the ages of 14-53 who could become pregnant should carefully weigh the risks and benefits of opioids with their physicians, as these medications increase the risk of pregnancy complications, including miscarriage,  delivery and stillbirth. It is also possible for babies to be born addicted to opioids. Opioid dependence withdrawal symptoms may include; feelings of uneasiness, increased pain, irritability, belly pain, diarrhea, sweats and goose-flesh. Testosterone replacement therapy:  Potential side effects include increased risk of stroke and heart attack, blood clots, increased blood pressure, increased cholesterol, enlarged prostate, sleep apnea, irritability/aggression and other mood disorders, and decreased fertility. Hillary Cooper (1970)             Page 1 of 4    Initials:_______    Benzodiazepines and non-benzodiazepine sleep medications: These medications can lead to problems such as addiction/dependence, sedation, fatigue, lightheadedness, dizziness, incoordination, falls, depression, hallucinations, and impaired judgment, memory and concentration. The ability to drive and operate machinery may also be affected. Abnormal sleep-related behaviors have been reported, including sleepwalking, driving, making telephone calls, eating, or having sex while not fully awake. These medications can suppress breathing and worsen sleep apnea, particularly when combined with alcohol or other sedating medications, potentially leading to death.  Dependence withdrawal symptoms may include tremors, anxiety, hallucinations and seizures. Stimulants:  Common adverse effects include addiction/dependence, increased blood pressure and heart rate, decreased appetite, nausea, involuntary weight loss, insomnia,  irritability, and headaches. These risks may increase when these medications are combined with other stimulants, such as caffeine pills or energy drinks, certain weight loss supplements and oral decongestants. Dependence withdrawal symptoms may include depressed mood, loss of interest, suicidal thoughts, anxiety, fatigue, appetite changes and agitation. I agree and understand that I and my prescriber have the following rights and responsibilities regarding my treatment plan:   1. MY RIGHTS:  To be informed of my treatment and medication plan. To be an active participant in my health and wellbeing. 2. MY RESPONSIBILITY AND UNDERSTANDING FOR USE OF MEDICATIONS   I will take medications at the dose and frequency as directed. For my safety, I will not increase or change how I take my medications without the recommendation of my healthcare provider.  I will actively participate in any program recommended by my provider which may improve function, including social, physical, psychological programs.  I will not take my medications with alcohol or other drugs not prescribed to me. I understand that drinking alcohol with my medications increases the chances of side effects, including reduced breathing rate and could lead to personal injury when operating machinery.  I understand that if I have a history of substance use disorders, including alcohol or other illicit drugs, that I may be at increased risk of addiction to my medications.  I agree to notify my provider immediately if I should become pregnant so that my treatment plan can be adjusted.    I agree and understand that I shall only receive controlled substance medications from the prescriber that signed this agreement unless there is written agreement among other prescribers of controlled substances outlining the responsibility of the medications being prescribed.  I understand that the if the controlled medication is not helping to achieve goals, the dosage may be tapered and no longer prescribed. 3. MY RESPONSIBILITY FOR COMMUNICATION / PRESCRIPTION RENEWALS   I agree that all controlled substance medications that I take will be prescribed only by my provider. If another healthcare provider prescribes me medication in an emergency, I will notify my provider within seventy-two (72) hours. Keila Sainz (1970)             Page 2 of 4    Initials:_______  [facility] I will arrange for refills at the prescribed interval ONLY during regular office hours. I will not ask for refills earlier than agreed, after-hours, on holidays or weekends. Refills may take up to 72 hours for processing and prescriptions to reach the pharmacy.  I will inform my other health care providers that I am taking these medications and of the existence of this [id number]. In the event of an emergency, I will provide the same information to the emergency department prescribers.  I will keep my provider updated on the pharmacy I am using for controlled medication prescription filling. 4. MY RESPONSIBILITY FOR PROTECTING MEDICATIONS   I will protect my prescriptions and medications. I understand that lost or misplaced prescriptions will not be replaced.  I will keep medications only for my own use and will not share them with others. I will keep all medications away from children.  I agree that if my medications are adjusted or discontinued, I will properly dispose of any remaining medications. I understand that I will be required to dispose of any remaining controlled medications as, directed by my prescriber, prior to being provided with any prescriptions for other controlled medications.   Medication drop box locations can be found at: [employer]  5. MY RESPONSIBILITY WITH ILLEGAL DRUGS    I will not use illegal or street drugs or another person's prescription medications not prescribed to me.  If there are identified addiction type symptoms, then referral to a program may be provided by my provider and I agree to follow through with this recommendation. 6. MY RESPONSIBILITY FOR COOPERATION WITH INVESTIGATIONS   I understand that my provider will comply with any applicable law and may discuss my use and/or possible misuse/abuse of controlled substances and alcohol, as appropriate, with any health care provider involved in my care, pharmacist, or legal authority.  I authorize my provider and pharmacy to cooperate fully with law enforcement agencies (as permitted by law) in the investigation of any possible misuse, sale, or other diversion of my controlled substances.  I agree to waive any applicable privilege or right of privacy or confidentiality with respect to these authorizations. 7. PROVIDERS RIGHT TO MONITOR FOR SAFETY: PRESCRIPTION MONITORING / DRUG TESTING   I consent to drug/toxicology screening and will submit to a drug screen upon my providers request to assure I am only taking the prescribed drugs for my safety monitoring. I understand that a drug screen is a laboratory test in which a sample of my urine, blood or saliva is checked to see what drugs I have been taking. This may entail an observed urine specimen, which means that a nurse or other health care provider may watch me provide urine, and I will cooperate if I am asked to provide an observed specimen. Julio Leach (1970)             Page 3 of 4    Initials:_______  Lisa Toro I understand that my provider will check a copy of my State Prescription Monitoring Program () Report in order to safely prescribe medications.      Pill Counts: I consent to pill counts when requested. I may be asked to bring all my prescribed controlled substance medications, in their original bottles, to all of my scheduled appointments. In addition, my provider may ask me to come to the practice at any time for a random pill count. 8. TERMINATION OF THIS AGREEMENT   For my safety, my prescriber has the right to stop prescribing controlled substance medications and may end this agreement.  Conditions that may result in termination of this agreement:  a. I do not show any improvement in pain, or my activity has not improved. b. I develop rapid tolerance or loss of improvement, as described in my treatment plan.  c. I develop significant side effects from the medication. d. My behavior is not consistent with the responsibilities outlined above, thereby causing safety concerns to continue prescribing controlled substance medications. e. I fail to follow the terms of this agreement. f. Other:____________________________     UNDERSTANDING THIS MEDICATION AGREEMENT:    I have read the above and have had all my questions answered. For chronic disease management, I know that my symptoms can be managed with many types of treatments. A chronic medication trial may be part of my treatment, but I must be an active participant in my care. Medication therapy is only one part of my symptom management plan. In some cases, there may be limited scientific evidence to support the chronic use of certain medications to improve symptoms and daily function. Furthermore, in certain circumstances, there may be scientific information that suggests that the use of chronic controlled substances may worsen my symptoms and increase my risk of unintentional death directly related to this medication therapy.   I know that if my provider feels my risk from controlled medications is greater than my benefit, I will have my controlled substance medication(s) compassionately lowered or removed altogether. I further agree to allow this office to contact my HIPAA contact if there are concerns about my safety and use of the controlled medications. I have agreed to use the prescribed controlled substance medications to me as instructed by my provider and as stated in this Medication Agreement. My initial on each page and my signature below shows that I have read each page and I have had the opportunity to ask questions with answers provided by my provider.       Patient Name (Printed): _____________________________________    Patient Signature:  ______________________   Date: _____________      Prescriber Name (Printed): ___________________________________    Prescriber Signature: _____________________  Date: _____________     Zackary Ferrer (1970)             Page 4 of 4

## 2022-03-10 NOTE — LETTER
3/10/2022    Patient: Bridget Clinton   YOB: 1970   Date of Visit: 3/10/2022     Geo Olivera, [address]  [address] [address]  Via Fax: [phone]    Dear Geo Olivera MD,      Thank you for referring Ms. Will Lea to [facility] for evaluation. My notes for this consultation are attached. If you have questions, please do not hesitate to call me. I look forward to following your patient along with you.       Sincerely,    Magdaleno Quinn MD

## 2022-03-10 NOTE — PROGRESS NOTES
[facility] (: 1970) is a 46 y.o. female, patient, here for evaluation of the following chief complaint(s):  Surgical Follow-up       ASSESSMENT/PLAN:  Below is the assessment and plan developed based on review of pertinent history, physical exam, labs, studies, and medications. Patient presents today approximately 3 months status post recent revision fusion. She does have some residual numbness and right-sided low back pain, as detailed below. Radiologic findings reviewed in detail with the patient. Referral to begin formal physical therapy provided to the patient today. I also provided her a refill of her gabapentin. Patient signed the controlled substance contract today in office. Informed the patient to call the office with any acute worsening of symptoms. She will follow up in 3 months. 1. S/P lumbar fusion  -     XR SPINE LUMB 2 OR 3 V; Future  2. Chronic right-sided low back pain without sciatica  -     gabapentin (NEURONTIN) 300 mg capsule; 1-2 tablets at night and 1 tablet in the morning, Normal, Disp-90 Capsule, R-0      Return in about 3 months (around 6/10/2022). SUBJECTIVE/OBJECTIVE:  [facility] (: 1970) is a 46 y.o. female. No flowsheet data found. Patient presents today approximately 3 months status post recent L3-5 fusion. She continues to progress well. She does report some numbness in her bilateral buttock radiating around to her lateral hips. She also has some right-sided low back pain, which she believes still limits her ability to stand and walk for prolonged periods of time. She denies lower extremity weakness. Denies changes in bowel or bladder control. Imaging:    XR Results (most recent):  Results from Appointment encounter on 03/10/22    XR SPINE LUMB 2 OR 3 V    Narrative  AP and lateral views of the lumbar spine reveal stable L3-5 lumbar fusion. Stable hardware. No acute changes. Well-maintained lumbar lordosis.   No evidence of acute fractures, lytic lesions or subluxations noted. MRI Results (most recent):  Results from [place] encounter on 11/11/20    MRI CERV SPINE WO CONT    Narrative  Clinical history: Neck pain  INDICATION:   Neck pain  COMPARISON: 6/18/2018    TECHNIQUE: MR imaging of the cervical spine was performed including sagittal T1,  T2, STIR;  axial GRE, T1. Contrast was not administered. 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 Minimal cord compression at  C4-5. Sherryle Moder Vertebral body heights are maintained. Marrow signal is normal.  The  craniocervical junction is intact. No Chiari or syrinx. No cord myelomalacia. .      C2/3:   The spinal canal and neuroforamina are widely patent. C3/4:  Mild facet arthropathy and uncovertebral degenerative change is greater  on the right. Disc protrusion/osteophyte asymmetric to the right. Canal is  patent. There is moderate to severe right foraminal stenosis. .    C4/5:  Mild facet arthropathy. Mild broad-based osteophyte with superimposed  left foraminal protrusion. Moderate to severe canal stenosis with minimal cord  compression. Severe left and right foraminal stenosis. .    C5/6:  Mild facet arthropathy. Mild right left foraminal protrusion/osteophyte. Mild canal and severe left foraminal stenosis. C6/7:  Disc bulge/osteophyte is broad-based. Mild facet arthropathy. Mild canal  stenosis. Mild bilateral foraminal stenoses. .    C7/T1:  Disc bulge/osteophyte. Minimal canal stenosis. Foramina are patent. Impression  IMPRESSION:  Interval progression of disc degenerative change since the 6/18/2018  examination. There is currently moderate to severe canal stenosis and severe bilateral  foraminal stenosis at C4-C5. Minimal cord compression at C4-5 without cord  signal abnormalities. Mild canal and severe left foraminal stenosis at C5-6. Moderate to severe right foraminal stenosis at C3-4.     Additional, less severe degenerative findings are as described in detail above. .         Allergies   Allergen Reactions    Augmentin [Amoxicillin-Pot Clavulanate] Nausea and Vomiting       Current Outpatient Medications   Medication Sig    amLODIPine (NORVASC) 10 mg tablet     gabapentin (NEURONTIN) 300 mg capsule 1-2 tablets at night and 1 tablet in the morning    irbesartan-hydroCHLOROthiazide (AVALIDE) 300-12.5 mg per tablet Take 1 Tablet by mouth daily.  omeprazole (PriLOSEC OTC) 20 mg tablet Take 20 mg by mouth daily.  methylphenidate ER 36 mg 24 hr tab Take 36 mg by mouth as needed.  metoprolol succinate (TOPROL-XL) 100 mg tablet Take 1 Tablet by mouth daily.  DULoxetine (CYMBALTA) 30 mg capsule Take 90 mg by mouth daily.  metoprolol succinate (TOPROL-XL) 50 mg XL tablet Take 1 Tablet by mouth. (Patient not taking: Reported on 3/10/2022)    gabapentin (NEURONTIN) 600 mg tablet Take 1 Tablet by mouth three (3) times daily. Max Daily Amount: 1,800 mg. (Patient not taking: Reported on 3/10/2022)    gabapentin (NEURONTIN) 300 mg capsule Take 1 Capsule by mouth three (3) times daily. Max Daily Amount: 900 mg. (Patient not taking: Reported on 3/10/2022)    senna-docusate (PERICOLACE) 8.6-50 mg per tablet Take 1 Tablet by mouth two (2) times a day. (Patient not taking: Reported on 3/10/2022)    AMLODIPINE BESYLATE, BULK, Take 10 mg by mouth daily. (Patient not taking: Reported on 3/10/2022)    gabapentin (NEURONTIN) 300 mg capsule Take 300 mg by mouth three (3) times daily. (Patient not taking: Reported on 3/10/2022)     No current facility-administered medications for this visit.        Past Medical History:   Diagnosis Date    Chronic pain     back-low down right leg to foot    GERD (gastroesophageal reflux disease)     Hypertension     Psychiatric disorder     DEPRESSION    Sleep apnea     USES CPAP        Past Surgical History:   Procedure Laterality Date    HX BREAST BIOPSY Right     benign core bx    HX CHOLECYSTECTOMY      HX GYN       x 2    HX HERNIA REPAIR  1607    umbilical    HX HYSTERECTOMY      HX LUMBAR FUSION  2016    L4-L5     IR INJ FORAMIN EPID LUMB ANES/STER SNGL  9/15/2020       Family History   Problem Relation Age of Onset    Hypertension Mother     OSTEOARTHRITIS Mother     Hypertension Father     OSTEOARTHRITIS Father     Breast Cancer Sister 46    Cancer Sister         BREAST CA AGE 46    Anesth Problems Neg Hx         Social History     Tobacco Use    Smoking status: Current Every Day Smoker     Packs/day: 1.00     Years: 20.00     Pack years: 20.00    Smokeless tobacco: Never Used   Vaping Use    Vaping Use: Never used   Substance Use Topics    Alcohol use: Yes     Comment: social    Drug use: Never        Review of Systems   Constitutional: Negative for chills and fever. Respiratory: Negative for cough, shortness of breath and wheezing. Cardiovascular: Negative for chest pain and palpitations. Gastrointestinal: Negative for nausea and vomiting. Musculoskeletal: Positive for arthralgias, back pain, neck pain and neck stiffness. Negative for gait problem. Skin: Negative for color change and wound. Neurological: Positive for numbness. Negative for weakness and headaches. Psychiatric/Behavioral: Negative for confusion and hallucinations. Vitals:  Ht 5' 5\" (1.651 m)   Wt 260 lb (117.9 kg)   BMI 43.27 kg/m²    Body mass index is 43.27 kg/m². Physical Exam  Integumentary  Assessment of Surgical Incision - healing and consistent with normal anticipated wound healing. Neurologic  Sensory  Light Touch - Intact - Globally. Overall Assessment of Muscle Strength and Tone reveals  Lower Extremities - Right Iliopsoas - 5/5. Left Iliopsoas - 5/5. Right Tibialis Anterior - 5/5. Left Tibialis Anterior - 5/5. Right Gastroc-Soleus - 5/5. Left Gastroc-Soleus - 5/5. Right EHL - 5/5. Left EHL - 5/5.   General Assessment of Reflexes  Right Ankle - Clonus is not present. Left Ankle - Clonus is not present. Reflexes (Dermatomes)  2/2 Normal - Left Achilles (L5-S2), Left Knee (L2-4), Right Achilles (L5-S2) and Right Knee (L2-4). Musculoskeletal  Global Assessment  Examination of related systems reveals - well-developed, well-nourished, in no acute distress, alert and oriented x 3 and normal coordination. Spine/Ribs/Pelvis  Lumbosacral Spine - Examination of the lumbosacral spine reveals - no known fractures or deformities. Inspection and Palpation - Tenderness - moderate. Assessment of pain reveals the following findings - The pain is characterized as - moderate. Location - pain refers to lower back, right-sided. Dr. Jeanine Suárez was available for immediate consult during this encounter. An electronic signature was used to authenticate this note.   -- SUBHA Paul

## 2022-03-18 PROBLEM — M76.62 ACHILLES BURSITIS OF LEFT LOWER EXTREMITY: Status: ACTIVE | Noted: 2022-01-27

## 2022-03-18 PROBLEM — M51.369 LUMBAR DEGENERATIVE DISC DISEASE: Status: ACTIVE | Noted: 2022-01-27

## 2022-03-18 PROBLEM — M51.36 LUMBAR DEGENERATIVE DISC DISEASE: Status: ACTIVE | Noted: 2022-01-27

## 2022-03-19 PROBLEM — M48.062 LUMBAR STENOSIS WITH NEUROGENIC CLAUDICATION: Status: ACTIVE | Noted: 2021-12-15

## 2022-03-19 PROBLEM — Z98.1 S/P LUMBAR FUSION: Status: ACTIVE | Noted: 2022-01-27

## 2022-04-28 DIAGNOSIS — G89.29 CHRONIC RIGHT-SIDED LOW BACK PAIN WITHOUT SCIATICA: ICD-10-CM

## 2022-04-28 DIAGNOSIS — M54.50 CHRONIC RIGHT-SIDED LOW BACK PAIN WITHOUT SCIATICA: ICD-10-CM

## 2022-04-29 RX ORDER — GABAPENTIN 300 MG/1
CAPSULE ORAL
Qty: 90 CAPSULE | Refills: 0 | Status: SHIPPED | OUTPATIENT
Start: 2022-04-29

## 2022-05-03 DIAGNOSIS — M54.50 CHRONIC RIGHT-SIDED LOW BACK PAIN WITHOUT SCIATICA: ICD-10-CM

## 2022-05-03 DIAGNOSIS — G89.29 CHRONIC RIGHT-SIDED LOW BACK PAIN WITHOUT SCIATICA: ICD-10-CM

## 2022-05-03 RX ORDER — GABAPENTIN 300 MG/1
CAPSULE ORAL
Qty: 90 CAPSULE | Refills: 0 | Status: SHIPPED | OUTPATIENT
Start: 2022-05-03 | End: 2022-07-25

## 2022-05-16 ENCOUNTER — TRANSCRIBE ORDER (OUTPATIENT)
Dept: SCHEDULING | Age: 52
End: 2022-05-16

## 2022-05-16 DIAGNOSIS — Z12.31 VISIT FOR SCREENING MAMMOGRAM: Primary | ICD-10-CM

## 2022-05-31 ENCOUNTER — HOSPITAL ENCOUNTER (OUTPATIENT)
Dept: MAMMOGRAPHY | Age: 52
Discharge: HOME | End: 2022-05-31
Attending: SPECIALIST
Payer: COMMERCIAL

## 2022-05-31 DIAGNOSIS — Z12.31 VISIT FOR SCREENING MAMMOGRAM: ICD-10-CM

## 2022-05-31 PROCEDURE — 77067 SCR MAMMO BI INCL CAD: CPT

## 2022-06-09 ENCOUNTER — OFFICE VISIT (OUTPATIENT)
Dept: ORTHOPEDIC SURGERY | Age: 52
End: 2022-06-09
Payer: COMMERCIAL

## 2022-06-09 VITALS — HEIGHT: 65 IN | WEIGHT: 260 LBS | BODY MASS INDEX: 43.32 KG/M2

## 2022-06-09 DIAGNOSIS — M54.42 CHRONIC BILATERAL LOW BACK PAIN WITH LEFT-SIDED SCIATICA: ICD-10-CM

## 2022-06-09 DIAGNOSIS — G89.29 CHRONIC BILATERAL LOW BACK PAIN WITH LEFT-SIDED SCIATICA: ICD-10-CM

## 2022-06-09 DIAGNOSIS — Z98.1 S/P LUMBAR FUSION: Primary | ICD-10-CM

## 2022-06-09 PROCEDURE — 99214 OFFICE O/P EST MOD 30 MIN: CPT | Performed by: STUDENT IN AN ORGANIZED HEALTH CARE EDUCATION/TRAINING PROGRAM

## 2022-06-09 RX ORDER — DIAZEPAM 5 MG/1
5 TABLET ORAL ONCE
Qty: 2 TABLET | Refills: 0 | Status: SHIPPED | OUTPATIENT
Start: 2022-06-09 | End: 2022-06-09

## 2022-06-09 NOTE — LETTER
6/15/2022    Patient: Sergei Rodríguez   YOB: 1970   Date of Visit: 6/9/2022     Veronica Hirsch, [address]  [address] [address]  Via Fax: [phone]    Dear Veronica Hirsch MD,      Thank you for referring Ms. Wesley Vaz to [facility] for evaluation. My notes for this consultation are attached. If you have questions, please do not hesitate to call me. I look forward to following your patient along with you.       Sincerely,    Makayla Em MD

## 2022-06-15 NOTE — PROGRESS NOTES
Edinson Geronimo (: 1970) is a 46 y.o. female, patient, here for evaluation of the following chief complaint(s):  LOW BACK PAIN       ASSESSMENT/PLAN:  Below is the assessment and plan developed based on review of pertinent history, physical exam, labs, studies, and medications. Patient presents today approximately 6 months s/p recent fusion. She continues with chronic bilateral low back pain with radiation into bilateral buttock and left lower extremity. She has tried and failed conservative management to include physical therapy and over the counter anti-inflammatories without significant relief of her symptoms. No weakness noted on physical exam. Denies acute changes in bowel or bladder control. She will continue with gabapentin. At this point, I would like to obtain a lumbar MRI to evaluate for compressive pathology. She will follow up to discuss the results. 1. S/P lumbar fusion  -     XR SPINE LUMB MIN 4 V; Future  -     diazePAM (VALIUM) 5 mg tablet; Take 1 Tablet by mouth once for 1 dose. Max Daily Amount: 5 mg. Take one an hour prior to procedure, repeat 30 minutes prior if needed, Normal, Disp-2 Tablet, R-0  2. Chronic bilateral low back pain with left-sided sciatica  -     diazePAM (VALIUM) 5 mg tablet; Take 1 Tablet by mouth once for 1 dose. Max Daily Amount: 5 mg. Take one an hour prior to procedure, repeat 30 minutes prior if needed, Normal, Disp-2 Tablet, R-0      No follow-ups on file. SUBJECTIVE/OBJECTIVE:  Edinson Geronimo (: 1970) is a 46 y.o. female. No flowsheet data found. Patient presents today approximately 6 months s/p recent lumbar fusion. Since her last visit, she has completed a round of physical therapy with no relief of her symptoms. Unfortunately, she reports continued bilateral low back pain with radiation into bilateral buttock and left lower extremity. Her symptoms are worsened with prolonged periods of standing.  Her symptoms are improved with leaning forward and sitting She is currently taking gabapentin BID and Advil as needed without significant relief of her symptoms. She denies any injury or trauma. Denies any lower extremity weakness. Denies acute changes in bowel or bladder control. Imaging:    XR Results (most recent):  Results from Appointment encounter on 06/09/22    XR SPINE LUMB MIN 4 V    Narrative  AP and lateral of the lumbar spine demonstrates a stable L3-L5 fusion. No acute changes noted. Stable alignment. Allergies   Allergen Reactions    Augmentin [Amoxicillin-Pot Clavulanate] Nausea and Vomiting       Current Outpatient Medications   Medication Sig    gabapentin (NEURONTIN) 300 mg capsule TAKE 1 TABLET BY MOUTH IN THE MORNING AND 1-2 TABLETS AT NIGHT (Patient not taking: Reported on 6/9/2022)    gabapentin (NEURONTIN) 300 mg capsule TAKE 1 TABLET BY MOUTH IN THE MORNING AND 1-2 TABLETS AT NIGHT (Patient not taking: Reported on 6/9/2022)    amLODIPine (NORVASC) 10 mg tablet     metoprolol succinate (TOPROL-XL) 50 mg XL tablet Take 1 Tablet by mouth. (Patient not taking: Reported on 3/10/2022)    gabapentin (NEURONTIN) 600 mg tablet Take 1 Tablet by mouth three (3) times daily. Max Daily Amount: 1,800 mg. (Patient not taking: Reported on 3/10/2022)    gabapentin (NEURONTIN) 300 mg capsule Take 1 Capsule by mouth three (3) times daily. Max Daily Amount: 900 mg. (Patient not taking: Reported on 3/10/2022)    senna-docusate (PERICOLACE) 8.6-50 mg per tablet Take 1 Tablet by mouth two (2) times a day. (Patient not taking: Reported on 3/10/2022)    AMLODIPINE BESYLATE, BULK, Take 10 mg by mouth daily. (Patient not taking: Reported on 3/10/2022)    irbesartan-hydroCHLOROthiazide (AVALIDE) 300-12.5 mg per tablet Take 1 Tablet by mouth daily.  omeprazole (PriLOSEC OTC) 20 mg tablet Take 20 mg by mouth daily.  methylphenidate ER 36 mg 24 hr tab Take 36 mg by mouth as needed.     gabapentin (NEURONTIN) 300 mg capsule Take 300 mg by mouth three (3) times daily. (Patient not taking: Reported on 3/10/2022)    metoprolol succinate (TOPROL-XL) 100 mg tablet Take 1 Tablet by mouth daily.  DULoxetine (CYMBALTA) 30 mg capsule Take 90 mg by mouth daily. No current facility-administered medications for this visit. Past Medical History:   Diagnosis Date    Chronic pain     back-low down right leg to foot    GERD (gastroesophageal reflux disease)     Hypertension     Psychiatric disorder     DEPRESSION    Sleep apnea     USES CPAP        Past Surgical History:   Procedure Laterality Date    HX BREAST BIOPSY Right     benign core bx    HX CHOLECYSTECTOMY      HX GYN       x 2    HX HERNIA REPAIR  3/0259    umbilical    HX HYSTERECTOMY      HX LUMBAR FUSION      L4-L5     IR INJ FORAMIN EPID LUMB ANES/STER SNGL  9/15/2020       Family History   Problem Relation Age of Onset    Hypertension Mother     OSTEOARTHRITIS Mother     Hypertension Father     OSTEOARTHRITIS Father     Breast Cancer Sister 46    Cancer Sister         BREAST CA AGE 46    Anesth Problems Neg Hx         Social History     Tobacco Use    Smoking status: Current Every Day Smoker     Packs/day: 1.00     Years: 20.00     Pack years: 20.00    Smokeless tobacco: Never Used   Vaping Use    Vaping Use: Never used   Substance Use Topics    Alcohol use: Yes     Comment: social    Drug use: Never        Review of Systems   Musculoskeletal: Positive for arthralgias, back pain and myalgias. Neurological: Negative for weakness. All other systems reviewed and are negative. Vitals:  Ht 5' 5\" (1.651 m)   Wt 260 lb (117.9 kg)   BMI 43.27 kg/m²    Body mass index is 43.27 kg/m². Physical Exam  Integumentary  Assessment of Surgical Incision - healing and consistent with normal anticipated wound healing. Neurologic  Sensory  Light Touch - Intact - Globally.   Overall Assessment of Muscle Strength and Tone reveals  Lower Extremities - Right Iliopsoas - 5/5. Left Iliopsoas - 5/5. Right Tibialis Anterior - 5/5. Left Tibialis Anterior - 5/5. Right Gastroc-Soleus - 5/5. Left Gastroc-Soleus - 5/5. Right EHL - 5/5. Left EHL - 5/5. General Assessment of Reflexes  Right Ankle - Clonus is not present. Left Ankle - Clonus is not present. Reflexes (Dermatomes)  2/2 Normal - Left Achilles (L5-S2), Left Knee (L2-4), Right Achilles (L5-S2) and Right Knee (L2-4). Musculoskeletal  Global Assessment  Examination of related systems reveals - well-developed, well-nourished, in no acute distress, alert and oriented x 3 and normal coordination. Spine/Ribs/Pelvis  Lumbosacral Spine - Examination of the lumbosacral spine reveals - no known fractures or deformities. Inspection and Palpation - Tenderness - moderate. Assessment of pain reveals the following findings - The pain is characterized as - moderate. Location - pain refers to lower back bilaterally. Dr. Tate Silverman was available for immediate consult during this encounter. An electronic signature was used to authenticate this note.   -- SUBHA Craven

## 2022-07-25 DIAGNOSIS — M54.50 CHRONIC RIGHT-SIDED LOW BACK PAIN WITHOUT SCIATICA: ICD-10-CM

## 2022-07-25 DIAGNOSIS — G89.29 CHRONIC RIGHT-SIDED LOW BACK PAIN WITHOUT SCIATICA: ICD-10-CM

## 2022-07-25 RX ORDER — GABAPENTIN 300 MG/1
CAPSULE ORAL
Qty: 90 CAPSULE | Refills: 0 | Status: SHIPPED | OUTPATIENT
Start: 2022-07-25

## 2022-11-22 ENCOUNTER — TRANSCRIBE ORDER (OUTPATIENT)
Dept: SCHEDULING | Age: 52
End: 2022-11-22

## 2022-11-22 DIAGNOSIS — M54.16 LUMBAR RADICULOPATHY: Primary | ICD-10-CM

## 2022-11-29 ENCOUNTER — HOSPITAL ENCOUNTER (OUTPATIENT)
Dept: MRI IMAGING | Age: 52
Discharge: HOME | End: 2022-11-29
Attending: NURSE PRACTITIONER
Payer: COMMERCIAL

## 2022-11-29 DIAGNOSIS — M54.16 LUMBAR RADICULOPATHY: ICD-10-CM

## 2022-11-29 PROCEDURE — 72148 MRI LUMBAR SPINE W/O DYE: CPT

## 2023-04-07 ENCOUNTER — TRANSCRIBE ORDER (OUTPATIENT)
Dept: SCHEDULING | Age: 53
End: 2023-04-07

## 2023-04-20 ENCOUNTER — HOSPITAL ENCOUNTER (OUTPATIENT)
Dept: MRI IMAGING | Age: 53
Discharge: HOME | End: 2023-04-20
Attending: STUDENT IN AN ORGANIZED HEALTH CARE EDUCATION/TRAINING PROGRAM
Payer: COMMERCIAL

## 2023-04-20 DIAGNOSIS — M54.2 CERVICALGIA: ICD-10-CM

## 2023-04-20 PROCEDURE — 72141 MRI NECK SPINE W/O DYE: CPT

## 2023-04-23 DIAGNOSIS — M54.2 CERVICALGIA: Primary | ICD-10-CM

## 2023-10-09 ENCOUNTER — TRANSCRIBE ORDERS (OUTPATIENT)
Facility: HOSPITAL | Age: 53
End: 2023-10-09

## 2023-10-09 DIAGNOSIS — Z12.31 VISIT FOR SCREENING MAMMOGRAM: Primary | ICD-10-CM

## 2023-10-17 ENCOUNTER — HOSPITAL ENCOUNTER (OUTPATIENT)
Facility: HOSPITAL | Age: 53
Discharge: HOME | End: 2023-10-20
Payer: COMMERCIAL

## 2023-10-17 VITALS — HEIGHT: 65 IN | WEIGHT: 260 LBS | BODY MASS INDEX: 43.32 KG/M2

## 2023-10-17 DIAGNOSIS — Z12.31 VISIT FOR SCREENING MAMMOGRAM: ICD-10-CM

## 2023-10-17 PROCEDURE — 77063 BREAST TOMOSYNTHESIS BI: CPT

## 2024-04-10 ENCOUNTER — TRANSCRIBE ORDERS (OUTPATIENT)
Facility: HOSPITAL | Age: 54
End: 2024-04-10

## 2024-04-10 DIAGNOSIS — R41.3 MEMORY IMPAIRMENT: Primary | ICD-10-CM

## 2024-04-18 ENCOUNTER — HOSPITAL ENCOUNTER (OUTPATIENT)
Age: 54
Discharge: HOME | End: 2024-04-18
Payer: COMMERCIAL

## 2024-04-18 DIAGNOSIS — R41.3 MEMORY IMPAIRMENT: ICD-10-CM

## 2024-04-18 PROCEDURE — 70551 MRI BRAIN STEM W/O DYE: CPT

## 2024-04-23 ENCOUNTER — TRANSCRIBE ORDERS (OUTPATIENT)
Facility: HOSPITAL | Age: 54
End: 2024-04-23

## 2024-04-23 DIAGNOSIS — R41.3 IMPAIRED MEMORY: Primary | ICD-10-CM

## 2024-04-26 ENCOUNTER — HOSPITAL ENCOUNTER (OUTPATIENT)
Facility: HOSPITAL | Age: 54
Discharge: HOME | End: 2024-04-26
Payer: COMMERCIAL

## 2024-04-26 DIAGNOSIS — R41.3 IMPAIRED MEMORY: ICD-10-CM

## 2024-04-26 PROCEDURE — 93880 EXTRACRANIAL BILAT STUDY: CPT

## 2024-04-28 LAB
VAS LEFT CCA DIST EDV: 16.2 CM/S
VAS LEFT CCA DIST PSV: 67.4 CM/S
VAS LEFT CCA PROX EDV: 20.1 CM/S
VAS LEFT CCA PROX PSV: 83.2 CM/S
VAS LEFT ECA EDV: 8.54 CM/S
VAS LEFT ECA PSV: 87.5 CM/S
VAS LEFT ICA DIST EDV: 33.1 CM/S
VAS LEFT ICA DIST PSV: 108.2 CM/S
VAS LEFT ICA MID EDV: 29.6 CM/S
VAS LEFT ICA MID PSV: 78.8 CM/S
VAS LEFT ICA PROX EDV: 15.8 CM/S
VAS LEFT ICA PROX PSV: 46.9 CM/S
VAS LEFT ICA/CCA PSV: 1.6 NO UNITS
VAS LEFT SUBCLAVIAN PROX EDV: 0 CM/S
VAS LEFT SUBCLAVIAN PROX PSV: 230.1 CM/S
VAS LEFT VERTEBRAL EDV: 16.69 CM/S
VAS LEFT VERTEBRAL PSV: 63.3 CM/S
VAS RIGHT CCA DIST EDV: 16.5 CM/S
VAS RIGHT CCA DIST PSV: 68.2 CM/S
VAS RIGHT CCA PROX EDV: 16.2 CM/S
VAS RIGHT CCA PROX PSV: 89.1 CM/S
VAS RIGHT ECA EDV: 7.92 CM/S
VAS RIGHT ECA PSV: 88.7 CM/S
VAS RIGHT ICA DIST EDV: 23.2 CM/S
VAS RIGHT ICA DIST PSV: 78.8 CM/S
VAS RIGHT ICA MID EDV: 19.7 CM/S
VAS RIGHT ICA MID PSV: 62.4 CM/S
VAS RIGHT ICA PROX EDV: 11.9 CM/S
VAS RIGHT ICA PROX PSV: 55.9 CM/S
VAS RIGHT ICA/CCA PSV: 1.2 NO UNITS
VAS RIGHT SUBCLAVIAN PROX EDV: 0 CM/S
VAS RIGHT SUBCLAVIAN PROX PSV: 145.7 CM/S
VAS RIGHT VERTEBRAL EDV: 10.02 CM/S
VAS RIGHT VERTEBRAL PSV: 48.8 CM/S

## 2025-01-20 ENCOUNTER — OFFICE VISIT (OUTPATIENT)
Age: 55
End: 2025-01-20
Payer: COMMERCIAL

## 2025-01-20 VITALS
BODY MASS INDEX: 36.92 KG/M2 | RESPIRATION RATE: 16 BRPM | WEIGHT: 221.6 LBS | OXYGEN SATURATION: 97 % | HEIGHT: 65 IN | SYSTOLIC BLOOD PRESSURE: 134 MMHG | DIASTOLIC BLOOD PRESSURE: 78 MMHG | TEMPERATURE: 97.2 F | HEART RATE: 60 BPM

## 2025-01-20 DIAGNOSIS — R41.3 DISTURBANCE OF MEMORY: Primary | ICD-10-CM

## 2025-01-20 DIAGNOSIS — I67.89 CEREBRAL MICROVASCULAR DISEASE: ICD-10-CM

## 2025-01-20 DIAGNOSIS — F90.2 ADHD (ATTENTION DEFICIT HYPERACTIVITY DISORDER), COMBINED TYPE: ICD-10-CM

## 2025-01-20 DIAGNOSIS — I65.23 BILATERAL CAROTID ARTERY STENOSIS: ICD-10-CM

## 2025-01-20 DIAGNOSIS — M96.1 LUMBAR POST-LAMINECTOMY SYNDROME: ICD-10-CM

## 2025-01-20 DIAGNOSIS — R41.3 DISTURBANCE OF MEMORY: ICD-10-CM

## 2025-01-20 PROCEDURE — 99244 OFF/OP CNSLTJ NEW/EST MOD 40: CPT | Performed by: PSYCHIATRY & NEUROLOGY

## 2025-01-20 RX ORDER — METHYLPHENIDATE HYDROCHLORIDE 54 MG/1
54 TABLET, EXTENDED RELEASE ORAL EVERY MORNING
COMMUNITY

## 2025-01-20 RX ORDER — VERAPAMIL HYDROCHLORIDE 240 MG/1
240 CAPSULE, DELAYED RELEASE ORAL NIGHTLY
COMMUNITY
Start: 2024-12-25

## 2025-01-20 RX ORDER — ACETAMINOPHEN 160 MG
2000 TABLET,DISINTEGRATING ORAL DAILY
COMMUNITY

## 2025-01-20 RX ORDER — M-VIT,TX,IRON,MINS/CALC/FOLIC 27MG-0.4MG
1 TABLET ORAL DAILY
COMMUNITY

## 2025-01-20 ASSESSMENT — PATIENT HEALTH QUESTIONNAIRE - PHQ9
SUM OF ALL RESPONSES TO PHQ QUESTIONS 1-9: 0
SUM OF ALL RESPONSES TO PHQ QUESTIONS 1-9: 0
2. FEELING DOWN, DEPRESSED OR HOPELESS: NOT AT ALL
1. LITTLE INTEREST OR PLEASURE IN DOING THINGS: NOT AT ALL
SUM OF ALL RESPONSES TO PHQ9 QUESTIONS 1 & 2: 0
SUM OF ALL RESPONSES TO PHQ QUESTIONS 1-9: 0
SUM OF ALL RESPONSES TO PHQ QUESTIONS 1-9: 0

## 2025-01-20 NOTE — PROGRESS NOTES
Consult  REFERRED BY:  Jameson Aj MD    CHIEF COMPLAINT: Memory loss      Subjective:     Sharon Benjamin is a 54 y.o. 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  She is under marked increase stress and has difficulty with concentration and does have ADHD, and has recently had her methylphenidate doubled to the 54 mg dose which has not really helped yet.  She had an MRI scan of the brain done in April 2024 that showed minimal microvascular disease, and a carotid Doppler study done April 2024 that showed mild disease only less than 50% both carotids but no other significant vascular problem.  The patient has never clinically had a stroke.  She does smoke and has high blood pressure and thinks her cholesterol might be high and is a borderline diabetic bypass hemoglobin A1c is being 5.7 and is working with her PCP on treating that.  So she has all the main risk factors for vascular disease.  She has a family history that her parents are forgetful in their 80s, but never been diagnosed with dementia with 1 great aunt on the maternal side did have dementia.  She is never had any head injury or unusual CNS infections or trauma, and does see a psychiatrist for her stress anxiety and depression which is still active.  She takes Cymbalta for her anxiety and depression.  She has a lot of orthopedic problems and arthritis in the neck and back and is seeing orthopedic surgery.  She has seen a pain management doctor in the past.  She has had basal cell skin cancer and a previous lumbar laminectomy.  She has no other cranial nerve problem, or other focal weakness or sensory loss or

## 2025-01-22 ENCOUNTER — TRANSCRIBE ORDERS (OUTPATIENT)
Facility: HOSPITAL | Age: 55
End: 2025-01-22

## 2025-01-22 DIAGNOSIS — I75.029 BLUE TOE SYNDROME, UNSPECIFIED LATERALITY (HCC): Primary | ICD-10-CM

## 2025-01-31 ENCOUNTER — HOSPITAL ENCOUNTER (OUTPATIENT)
Facility: HOSPITAL | Age: 55
Discharge: HOME | End: 2025-01-31
Attending: SURGERY
Payer: COMMERCIAL

## 2025-01-31 DIAGNOSIS — I75.029 BLUE TOE SYNDROME, UNSPECIFIED LATERALITY (HCC): ICD-10-CM

## 2025-01-31 PROCEDURE — 75635 CT ANGIO ABDOMINAL ARTERIES: CPT

## 2025-01-31 PROCEDURE — 71275 CT ANGIOGRAPHY CHEST: CPT

## 2025-01-31 PROCEDURE — 6360000004 HC RX CONTRAST MEDICATION: Performed by: SURGERY

## 2025-01-31 RX ORDER — IOPAMIDOL 755 MG/ML
100 INJECTION, SOLUTION INTRAVASCULAR
Status: COMPLETED | OUTPATIENT
Start: 2025-01-31 | End: 2025-01-31

## 2025-01-31 RX ADMIN — IOPAMIDOL 100 ML: 755 INJECTION, SOLUTION INTRAVENOUS at 08:39

## 2025-05-21 ENCOUNTER — OFFICE VISIT (OUTPATIENT)
Age: 55
End: 2025-05-21
Payer: COMMERCIAL

## 2025-05-21 VITALS
HEART RATE: 68 BPM | WEIGHT: 221.6 LBS | OXYGEN SATURATION: 98 % | BODY MASS INDEX: 36.92 KG/M2 | HEIGHT: 65 IN | DIASTOLIC BLOOD PRESSURE: 76 MMHG | RESPIRATION RATE: 18 BRPM | SYSTOLIC BLOOD PRESSURE: 134 MMHG

## 2025-05-21 DIAGNOSIS — R47.89 WORD FINDING DIFFICULTY: ICD-10-CM

## 2025-05-21 DIAGNOSIS — G47.33 OSA ON CPAP: ICD-10-CM

## 2025-05-21 DIAGNOSIS — R41.3 DISTURBANCE OF MEMORY: Primary | ICD-10-CM

## 2025-05-21 DIAGNOSIS — Z72.0 TOBACCO ABUSE: ICD-10-CM

## 2025-05-21 PROCEDURE — 99214 OFFICE O/P EST MOD 30 MIN: CPT | Performed by: NURSE PRACTITIONER

## 2025-05-21 RX ORDER — LISDEXAMFETAMINE DIMESYLATE 50 MG/1
50 CAPSULE ORAL EVERY MORNING
COMMUNITY
Start: 2025-05-05

## 2025-05-21 ASSESSMENT — PATIENT HEALTH QUESTIONNAIRE - PHQ9
1. LITTLE INTEREST OR PLEASURE IN DOING THINGS: NOT AT ALL
SUM OF ALL RESPONSES TO PHQ QUESTIONS 1-9: 0
SUM OF ALL RESPONSES TO PHQ QUESTIONS 1-9: 0
2. FEELING DOWN, DEPRESSED OR HOPELESS: NOT AT ALL
SUM OF ALL RESPONSES TO PHQ QUESTIONS 1-9: 0
SUM OF ALL RESPONSES TO PHQ QUESTIONS 1-9: 0

## 2025-05-21 NOTE — PROGRESS NOTES
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 She was last seen by Dr. Whittington in 2025. She is scheduled for neuropsych testing in 2025 with Dr. Sun.    She reports experiencing difficulties with word recall, often losing track of words mid-conversation. This issue has been ongoing for several years and has caused frustration among her family members. She also finds it challenging to recall words during work-related phone calls, which she describes as a frustrating experience. She expresses concern about her ability to adapt to new software at her workplace, fearing that her memory issues may lead to job loss. She has been managing her household tasks with the aid of lists, which she posts on walls to prevent misplacement.     She has been diagnosed with sleep apnea and has been using a CPAP machine for approximately 4 years, which she reports has significantly improved her condition.    She is currently on Cymbalta and Vyvanse, prescribed by her psychiatrist, Dr. Avila. She occasionally questions whether her OCD could be contributing to her memory issues.    She admits to smoking less than a pack of cigarettes daily, a habit she has maintained for about 20 years.        Recap from last visit :   Patient with memory disturbance, and she scored 30 out of 30 on a Mini-Mental status exam, so she will need neuropsych testing to see if she has a

## 2025-05-21 NOTE — PROGRESS NOTES
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 Have you been to the ER, urgent care clinic since your last visit?  Hospitalized since your last visit? Yes [facility] for insect bite     2. Have you seen or consulted any other health care providers outside of the [facility] since your last visit?  Include any pap smears or colon screening.  Yes see above

## 2025-07-21 NOTE — PROGRESS NOTES
Intake Note      Patient Name: Sharon Benjamin  YOB: 1970    Age: 54 y.o.  Date of Intake: 7/22/2025   Education: 12 Ethnicity White   Gender: Female Referring Provider: Dr. Whittington     REASON FOR REFERRAL AND EVALUATION PROCEDURES:  Sharon Benjamin  was referred for evaluation by her neurologist to assist in differential diagnosis and individualized treatment planning. she understood the rationale and procedures for evaluation, as well as the limits to confidentiality, and agreed to participate. she consented to have this report made available to her  treating providers through her  electronic medical records.   History Sources: Patient and Medical Record    HISTORY OF PRESENT ILLNESS:  The patient is a 54-year-old female with pertinent medical history noted for disturbance of memory, cerebral microvascular disease, and ADHD.  She presented independently for clinical interview and appeared to be an adequate historian with appropriate insight.  Per the patient's report, over the past 2 to 3 years, she has noticed differences in her cognitive functioning.  She reported the problems do not appear to be worsening over time but she has been paying them more attention and they appear to be more noticeable.  She described the problems to primarily include greater difficulties with planning and organization.  For example, the patient reported she needs to make to do lists and if she has several errands to run, it takes her longer to figure out the most efficient way to complete them.  She also reported the presence of word finding inefficiencies and she stated that when she has to give a presentation at work, she needs to write out exactly what she is going to say beforehand.  Finally, the patient also mentioned difficulties with attention, evidenced by getting distracted easily.    Pertaining to the patient's psychiatric history, she reported she was overly parental fight as a child.  She was the middle of

## 2025-07-22 ENCOUNTER — OFFICE VISIT (OUTPATIENT)
Age: 55
End: 2025-07-22
Payer: COMMERCIAL

## 2025-07-22 DIAGNOSIS — F90.2 ADHD (ATTENTION DEFICIT HYPERACTIVITY DISORDER), COMBINED TYPE: ICD-10-CM

## 2025-07-22 DIAGNOSIS — R41.3 DISTURBANCE OF MEMORY: Primary | ICD-10-CM

## 2025-07-22 DIAGNOSIS — F34.1 PERSISTENT DEPRESSIVE DISORDER WITH ANXIOUS DISTRESS, CURRENTLY MILD: ICD-10-CM

## 2025-07-22 DIAGNOSIS — G47.33 OSA ON CPAP: ICD-10-CM

## 2025-07-22 DIAGNOSIS — R47.89 WORD FINDING DIFFICULTY: ICD-10-CM

## 2025-07-22 PROCEDURE — 90791 PSYCH DIAGNOSTIC EVALUATION: CPT | Performed by: CLINICAL NEUROPSYCHOLOGIST

## 2025-08-05 ENCOUNTER — PROCEDURE VISIT (OUTPATIENT)
Age: 55
End: 2025-08-05

## 2025-08-05 DIAGNOSIS — R47.89 WORD FINDING DIFFICULTY: ICD-10-CM

## 2025-08-05 DIAGNOSIS — G47.33 OSA ON CPAP: ICD-10-CM

## 2025-08-05 DIAGNOSIS — F90.2 ADHD (ATTENTION DEFICIT HYPERACTIVITY DISORDER), COMBINED TYPE: ICD-10-CM

## 2025-08-05 DIAGNOSIS — R41.3 DISTURBANCE OF MEMORY: Primary | ICD-10-CM

## 2025-08-07 ENCOUNTER — PROCEDURE VISIT (OUTPATIENT)
Age: 55
End: 2025-08-07
Payer: COMMERCIAL

## 2025-08-07 DIAGNOSIS — F90.2 ADHD (ATTENTION DEFICIT HYPERACTIVITY DISORDER), COMBINED TYPE: ICD-10-CM

## 2025-08-07 DIAGNOSIS — F34.1 PERSISTENT DEPRESSIVE DISORDER: ICD-10-CM

## 2025-08-07 DIAGNOSIS — R41.3 DISTURBANCE OF MEMORY: Primary | ICD-10-CM

## 2025-08-07 DIAGNOSIS — F41.1 GENERALIZED ANXIETY DISORDER: ICD-10-CM

## 2025-08-07 PROCEDURE — 96138 PSYCL/NRPSYC TECH 1ST: CPT | Performed by: CLINICAL NEUROPSYCHOLOGIST

## 2025-08-07 PROCEDURE — 96139 PSYCL/NRPSYC TST TECH EA: CPT | Performed by: CLINICAL NEUROPSYCHOLOGIST

## 2025-08-07 PROCEDURE — 96133 NRPSYC TST EVAL PHYS/QHP EA: CPT | Performed by: CLINICAL NEUROPSYCHOLOGIST

## 2025-08-07 PROCEDURE — 96132 NRPSYC TST EVAL PHYS/QHP 1ST: CPT | Performed by: CLINICAL NEUROPSYCHOLOGIST

## 2025-08-20 ENCOUNTER — OFFICE VISIT (OUTPATIENT)
Age: 55
End: 2025-08-20
Payer: COMMERCIAL

## 2025-08-20 ENCOUNTER — CLINICAL DOCUMENTATION (OUTPATIENT)
Age: 55
End: 2025-08-20

## 2025-08-20 ENCOUNTER — HOSPITAL ENCOUNTER (OUTPATIENT)
Facility: HOSPITAL | Age: 55
Discharge: HOME | End: 2025-08-23

## 2025-08-20 VITALS
DIASTOLIC BLOOD PRESSURE: 88 MMHG | BODY MASS INDEX: 36.35 KG/M2 | TEMPERATURE: 97.9 F | SYSTOLIC BLOOD PRESSURE: 136 MMHG | HEIGHT: 65 IN | RESPIRATION RATE: 17 BRPM | OXYGEN SATURATION: 99 % | HEART RATE: 68 BPM | WEIGHT: 218.2 LBS

## 2025-08-20 DIAGNOSIS — M96.1 LUMBAR POST-LAMINECTOMY SYNDROME: ICD-10-CM

## 2025-08-20 DIAGNOSIS — F90.2 ATTENTION DEFICIT HYPERACTIVITY DISORDER (ADHD), COMBINED TYPE: ICD-10-CM

## 2025-08-20 DIAGNOSIS — I67.89 CEREBRAL MICROVASCULAR DISEASE: ICD-10-CM

## 2025-08-20 DIAGNOSIS — Z51.81 THERAPEUTIC DRUG MONITORING: ICD-10-CM

## 2025-08-20 DIAGNOSIS — Z98.1 S/P LUMBAR FUSION: ICD-10-CM

## 2025-08-20 DIAGNOSIS — R41.3 DISTURBANCE OF MEMORY: ICD-10-CM

## 2025-08-20 DIAGNOSIS — E55.9 VITAMIN D DEFICIENCY: ICD-10-CM

## 2025-08-20 DIAGNOSIS — E03.4 HYPOTHYROIDISM DUE TO ACQUIRED ATROPHY OF THYROID: ICD-10-CM

## 2025-08-20 DIAGNOSIS — E53.8 B12 DEFICIENCY: ICD-10-CM

## 2025-08-20 DIAGNOSIS — I65.23 BILATERAL CAROTID ARTERY STENOSIS: Primary | ICD-10-CM

## 2025-08-20 PROCEDURE — 99214 OFFICE O/P EST MOD 30 MIN: CPT | Performed by: PSYCHIATRY & NEUROLOGY

## 2025-08-20 RX ORDER — M-VIT,TX,IRON,MINS/CALC/FOLIC 27MG-0.4MG
1 TABLET ORAL DAILY
COMMUNITY

## 2025-08-20 RX ORDER — CLOPIDOGREL BISULFATE 75 MG/1
75 TABLET ORAL DAILY
COMMUNITY
Start: 2025-07-06

## 2025-08-20 RX ORDER — ACETAMINOPHEN 160 MG
2000 TABLET,DISINTEGRATING ORAL DAILY
COMMUNITY

## 2025-08-20 ASSESSMENT — PATIENT HEALTH QUESTIONNAIRE - PHQ9
SUM OF ALL RESPONSES TO PHQ QUESTIONS 1-9: 0
2. FEELING DOWN, DEPRESSED OR HOPELESS: NOT AT ALL
1. LITTLE INTEREST OR PLEASURE IN DOING THINGS: NOT AT ALL
SUM OF ALL RESPONSES TO PHQ QUESTIONS 1-9: 0

## 2025-08-21 ENCOUNTER — OFFICE VISIT (OUTPATIENT)
Age: 55
End: 2025-08-21
Payer: COMMERCIAL

## 2025-08-21 DIAGNOSIS — F90.2 ADHD (ATTENTION DEFICIT HYPERACTIVITY DISORDER), COMBINED TYPE: ICD-10-CM

## 2025-08-21 DIAGNOSIS — F34.1 PERSISTENT DEPRESSIVE DISORDER: ICD-10-CM

## 2025-08-21 DIAGNOSIS — F41.1 GENERALIZED ANXIETY DISORDER: ICD-10-CM

## 2025-08-21 DIAGNOSIS — R41.3 DISTURBANCE OF MEMORY: Primary | ICD-10-CM

## 2025-08-21 LAB
25(OH)D3 SERPL-MCNC: 31.6 NG/ML (ref 30–100)
FOLATE SERPL-MCNC: 6.7 NG/ML (ref 4.8–24.2)
TSH, 3RD GENERATION: 2.37 UIU/ML (ref 0.27–4.2)
VIT B12 SERPL-MCNC: 478 PG/ML (ref 232–1245)

## 2025-08-21 PROCEDURE — 96132 NRPSYC TST EVAL PHYS/QHP 1ST: CPT | Performed by: CLINICAL NEUROPSYCHOLOGIST

## 2025-08-27 DIAGNOSIS — Z51.81 THERAPEUTIC DRUG MONITORING: ICD-10-CM

## 2025-08-27 DIAGNOSIS — E53.8 B12 DEFICIENCY: ICD-10-CM

## 2025-08-27 DIAGNOSIS — E03.4 HYPOTHYROIDISM DUE TO ACQUIRED ATROPHY OF THYROID: ICD-10-CM

## 2025-08-27 DIAGNOSIS — R41.3 DISTURBANCE OF MEMORY: ICD-10-CM

## 2025-08-27 LAB
FOLATE SERPL-MCNC: 6.7 NG/ML (ref 4.8–24.2)
TSH, 3RD GENERATION: 2.37 UIU/ML (ref 0.27–4.2)
VIT B12 SERPL-MCNC: 478 PG/ML (ref 232–1245)

## 2025-08-28 ENCOUNTER — CLINICAL DOCUMENTATION (OUTPATIENT)
Age: 55
End: 2025-08-28

## 2025-08-28 LAB
Lab: NORMAL
Lab: NORMAL
REFERENCE LAB: NORMAL

## 2025-09-04 ENCOUNTER — TELEPHONE (OUTPATIENT)
Age: 55
End: 2025-09-04

## 2025-09-05 DIAGNOSIS — G31.84 MILD COGNITIVE IMPAIRMENT (MCI) DUE TO ALZHEIMER'S DISEASE (HCC): Primary | ICD-10-CM

## 2025-09-05 DIAGNOSIS — G30.9 MILD COGNITIVE IMPAIRMENT (MCI) DUE TO ALZHEIMER'S DISEASE (HCC): Primary | ICD-10-CM

## (undated) DEVICE — KIT MED IMAG CNTRST AGNT W/ IOPAMIDOL REUSE

## (undated) DEVICE — GLOVE ORANGE PI 7 1/2   MSG9075

## (undated) DEVICE — KIT EVAC 0.13IN RECT TB DIA10FR 400CC PVC 3 SPR Y CONN DRN

## (undated) DEVICE — GLIDESHEATH SLENDER ACCESS KIT: Brand: GLIDESHEATH SLENDER

## (undated) DEVICE — TOOL 14MH30 LEGEND 14CM 3MM: Brand: MIDAS REX ™

## (undated) DEVICE — SPLINT WR POS F/ARTERIAL ACC -- BX/10

## (undated) DEVICE — COVER,TABLE,HEAVY DUTY,60"X90",STRL: Brand: MEDLINE

## (undated) DEVICE — SPONGE GZ W4XL4IN COT 12 PLY TYP VII WVN C FLD DSGN

## (undated) DEVICE — SPECIAL PROCEDURE DRAPE 32" X 34": Brand: SPECIAL PROCEDURE DRAPE

## (undated) DEVICE — TOOL 14BA50 LEGEND 14CM 5MM BA: Brand: MIDAS REX ™

## (undated) DEVICE — ANGIOGRAPHY KIT

## (undated) DEVICE — GLOVE SURG SZ 8 L12IN FNGR THK94MIL STD WHT LTX FREE

## (undated) DEVICE — BONE WAX WHITE: Brand: BONE WAX WHITE

## (undated) DEVICE — HANDPIECE SET WITH BONE CLEANING TIP AND SUCTION TUBE: Brand: INTERPULSE

## (undated) DEVICE — SUTURE VCRL SZ 3-0 L27IN ABSRB UD CP-2 L26MM 1/2 CIR REV J868H

## (undated) DEVICE — BIPOLAR IRRIGATOR INTEGRATED TUBING AND BIPOLAR CORD SET, DISPOSABLE

## (undated) DEVICE — KIT HND CTRL 3 W STPCOCK ROT END 54IN PREM HI PRSS TBNG AT

## (undated) DEVICE — SOLUTION IRRIG 3000ML 0.9% SOD CHL USP UROMATIC PLAS CONT

## (undated) DEVICE — STERILE POLYISOPRENE POWDER-FREE SURGICAL GLOVES: Brand: PROTEXIS

## (undated) DEVICE — STERILE-Z SURGICAL PATIENT COVERS CLEAR POLYETHYLENE STERILE UNIVERSAL FIT 10 PER CASE: Brand: STERILE-Z

## (undated) DEVICE — FLOSEAL HEMOSTATIC MATRIX, 10ML: Brand: FLOSEAL HEMOSTATIC MATRIX

## (undated) DEVICE — PREP KIT PEEL PTCH POVIDONE IOD

## (undated) DEVICE — 4-PORT MANIFOLD: Brand: NEPTUNE 2

## (undated) DEVICE — TR BAND RADIAL ARTERY COMPRESSION DEVICE: Brand: TR BAND

## (undated) DEVICE — Device

## (undated) DEVICE — LAMINECTOMY-SMH: Brand: MEDLINE INDUSTRIES, INC.

## (undated) DEVICE — PROVE COVER: Brand: UNBRANDED

## (undated) DEVICE — ANGIOGRAPHIC CATHETER: Brand: IMPULSE™

## (undated) DEVICE — TOTAL TRAY, 16FR 10ML SIL FOLEY, URN: Brand: MEDLINE

## (undated) DEVICE — POSITIONER HD REST FOAM CMFRT TCH

## (undated) DEVICE — SOLUTION IV 250ML 0.9% SOD CHL CLR INJ FLX BG CONT PRT CLSR

## (undated) DEVICE — RADIFOCUS OPTITORQUE ANGIOGRAPHIC CATHETER: Brand: OPTITORQUE

## (undated) DEVICE — KIT MFLD ISOLATN NACL CNTRST PRT TBNG SPIK W/ PRSS TRNSDUC

## (undated) DEVICE — GUIDEWIRE VASC L260CM DIA0.035IN TIP L3MM STD EXCHG PTFE J

## (undated) DEVICE — CATH DIAGIMPLS MPA1 5FRX100CM -- IMPULSE 16391-117

## (undated) DEVICE — PACK PROCEDURE SURG HRT CATH

## (undated) DEVICE — SUTURE VCRL 2-0 L27IN ABSRB UD CP-2 L26MM 1/2 CIR REV CUT J869H

## (undated) DEVICE — 3M™ TEGADERM™ TRANSPARENT FILM DRESSING FRAME STYLE, 1626W, 4 IN X 4-3/4 IN (10 CM X 12 CM), 50/CT 4CT/CASE: Brand: 3M™ TEGADERM™